# Patient Record
Sex: FEMALE | Race: BLACK OR AFRICAN AMERICAN | Employment: FULL TIME | URBAN - METROPOLITAN AREA
[De-identification: names, ages, dates, MRNs, and addresses within clinical notes are randomized per-mention and may not be internally consistent; named-entity substitution may affect disease eponyms.]

---

## 2017-05-26 ENCOUNTER — HOSPITAL ENCOUNTER (INPATIENT)
Age: 32
LOS: 5 days | Discharge: HOME | DRG: 328 | End: 2017-06-01
Attending: EMERGENCY MEDICINE | Admitting: SURGERY
Payer: COMMERCIAL

## 2017-05-26 ENCOUNTER — ANESTHESIA (OUTPATIENT)
Dept: SURGERY | Age: 32
DRG: 328 | End: 2017-05-26
Payer: COMMERCIAL

## 2017-05-26 ENCOUNTER — ANESTHESIA EVENT (OUTPATIENT)
Dept: SURGERY | Age: 32
DRG: 328 | End: 2017-05-26
Payer: COMMERCIAL

## 2017-05-26 ENCOUNTER — APPOINTMENT (OUTPATIENT)
Dept: CT IMAGING | Age: 32
DRG: 328 | End: 2017-05-26
Attending: PHYSICIAN ASSISTANT
Payer: COMMERCIAL

## 2017-05-26 DIAGNOSIS — K35.80 ACUTE APPENDICITIS, UNSPECIFIED ACUTE APPENDICITIS TYPE: Primary | ICD-10-CM

## 2017-05-26 DIAGNOSIS — D72.829 LEUKOCYTOSIS, UNSPECIFIED TYPE: ICD-10-CM

## 2017-05-26 PROBLEM — Z09 SURGERY FOLLOW-UP: Status: ACTIVE | Noted: 2017-05-26

## 2017-05-26 LAB
ALBUMIN SERPL BCP-MCNC: 4.2 G/DL (ref 3.4–5)
ALBUMIN/GLOB SERPL: 1 {RATIO} (ref 0.8–1.7)
ALP SERPL-CCNC: 68 U/L (ref 45–117)
ALT SERPL-CCNC: 16 U/L (ref 13–56)
ANION GAP BLD CALC-SCNC: 8 MMOL/L (ref 3–18)
APPEARANCE UR: CLEAR
AST SERPL W P-5'-P-CCNC: 26 U/L (ref 15–37)
BASOPHILS # BLD AUTO: 0 K/UL (ref 0–0.06)
BASOPHILS # BLD: 0 % (ref 0–2)
BILIRUB SERPL-MCNC: 0.3 MG/DL (ref 0.2–1)
BILIRUB UR QL: NEGATIVE
BUN SERPL-MCNC: 13 MG/DL (ref 7–18)
BUN/CREAT SERPL: 19 (ref 12–20)
CALCIUM SERPL-MCNC: 9.7 MG/DL (ref 8.5–10.1)
CHLORIDE SERPL-SCNC: 101 MMOL/L (ref 100–108)
CO2 SERPL-SCNC: 30 MMOL/L (ref 21–32)
COLOR UR: YELLOW
CREAT SERPL-MCNC: 0.69 MG/DL (ref 0.6–1.3)
DIFFERENTIAL METHOD BLD: ABNORMAL
EOSINOPHIL # BLD: 0.1 K/UL (ref 0–0.4)
EOSINOPHIL NFR BLD: 1 % (ref 0–5)
ERYTHROCYTE [DISTWIDTH] IN BLOOD BY AUTOMATED COUNT: 14.3 % (ref 11.6–14.5)
GLOBULIN SER CALC-MCNC: 4.1 G/DL (ref 2–4)
GLUCOSE SERPL-MCNC: 104 MG/DL (ref 74–99)
GLUCOSE UR STRIP.AUTO-MCNC: NEGATIVE MG/DL
HCG SERPL QL: NEGATIVE
HCG UR QL: NEGATIVE
HCT VFR BLD AUTO: 37.6 % (ref 35–45)
HGB BLD-MCNC: 12.9 G/DL (ref 12–16)
HGB UR QL STRIP: NEGATIVE
KETONES UR QL STRIP.AUTO: NEGATIVE MG/DL
LEUKOCYTE ESTERASE UR QL STRIP.AUTO: NEGATIVE
LIPASE SERPL-CCNC: 110 U/L (ref 73–393)
LYMPHOCYTES # BLD AUTO: 14 % (ref 21–52)
LYMPHOCYTES # BLD: 2.5 K/UL (ref 0.9–3.6)
MCH RBC QN AUTO: 31.1 PG (ref 24–34)
MCHC RBC AUTO-ENTMCNC: 34.3 G/DL (ref 31–37)
MCV RBC AUTO: 90.6 FL (ref 74–97)
MONOCYTES # BLD: 0.9 K/UL (ref 0.05–1.2)
MONOCYTES NFR BLD AUTO: 5 % (ref 3–10)
NEUTS SEG # BLD: 14.4 K/UL (ref 1.8–8)
NEUTS SEG NFR BLD AUTO: 80 % (ref 40–73)
NITRITE UR QL STRIP.AUTO: NEGATIVE
PH UR STRIP: 7 [PH] (ref 5–8)
PLATELET # BLD AUTO: 388 K/UL (ref 135–420)
PMV BLD AUTO: 9.8 FL (ref 9.2–11.8)
POTASSIUM SERPL-SCNC: 3.8 MMOL/L (ref 3.5–5.5)
PROT SERPL-MCNC: 8.3 G/DL (ref 6.4–8.2)
PROT UR STRIP-MCNC: NEGATIVE MG/DL
RBC # BLD AUTO: 4.15 M/UL (ref 4.2–5.3)
SODIUM SERPL-SCNC: 139 MMOL/L (ref 136–145)
SP GR UR REFRACTOMETRY: 1.02 (ref 1–1.03)
UROBILINOGEN UR QL STRIP.AUTO: 1 EU/DL (ref 0.2–1)
WBC # BLD AUTO: 17.9 K/UL (ref 4.6–13.2)

## 2017-05-26 PROCEDURE — 77030012407 HC DRN WND BARD -B: Performed by: SURGERY

## 2017-05-26 PROCEDURE — 77030008683 HC TU ET CUF COVD -A: Performed by: ANESTHESIOLOGY

## 2017-05-26 PROCEDURE — 77030033271 HC TRCR ENDOSC EPATH2 J&J -B: Performed by: SURGERY

## 2017-05-26 PROCEDURE — 81025 URINE PREGNANCY TEST: CPT

## 2017-05-26 PROCEDURE — 77030006643: Performed by: ANESTHESIOLOGY

## 2017-05-26 PROCEDURE — 76060000034 HC ANESTHESIA 1.5 TO 2 HR: Performed by: SURGERY

## 2017-05-26 PROCEDURE — 77030002933 HC SUT MCRYL J&J -A: Performed by: SURGERY

## 2017-05-26 PROCEDURE — 96374 THER/PROPH/DIAG INJ IV PUSH: CPT

## 2017-05-26 PROCEDURE — 96375 TX/PRO/DX INJ NEW DRUG ADDON: CPT

## 2017-05-26 PROCEDURE — 74011250636 HC RX REV CODE- 250/636: Performed by: PHYSICIAN ASSISTANT

## 2017-05-26 PROCEDURE — 99284 EMERGENCY DEPT VISIT MOD MDM: CPT

## 2017-05-26 PROCEDURE — 80053 COMPREHEN METABOLIC PANEL: CPT | Performed by: PHYSICIAN ASSISTANT

## 2017-05-26 PROCEDURE — 81003 URINALYSIS AUTO W/O SCOPE: CPT | Performed by: FAMILY MEDICINE

## 2017-05-26 PROCEDURE — 74011636320 HC RX REV CODE- 636/320: Performed by: EMERGENCY MEDICINE

## 2017-05-26 PROCEDURE — 77030011640 HC PAD GRND REM COVD -A: Performed by: SURGERY

## 2017-05-26 PROCEDURE — 74011250636 HC RX REV CODE- 250/636: Performed by: SURGERY

## 2017-05-26 PROCEDURE — 0DJD4ZZ INSPECTION OF LOWER INTESTINAL TRACT, PERCUTANEOUS ENDOSCOPIC APPROACH: ICD-10-PCS | Performed by: SURGERY

## 2017-05-26 PROCEDURE — 77030034154 HC SHR COAG HARM ACE J&J -F: Performed by: SURGERY

## 2017-05-26 PROCEDURE — 77030032490 HC SLV COMPR SCD KNE COVD -B: Performed by: SURGERY

## 2017-05-26 PROCEDURE — 77030003580 HC NDL INSUF VERES J&J -B: Performed by: SURGERY

## 2017-05-26 PROCEDURE — 76010000153 HC OR TIME 1.5 TO 2 HR: Performed by: SURGERY

## 2017-05-26 PROCEDURE — 74011000250 HC RX REV CODE- 250

## 2017-05-26 PROCEDURE — 77030013079 HC BLNKT BAIR HGGR 3M -A: Performed by: ANESTHESIOLOGY

## 2017-05-26 PROCEDURE — 77030002935 HC SUT MCRYL J&J -C: Performed by: SURGERY

## 2017-05-26 PROCEDURE — 74011000250 HC RX REV CODE- 250: Performed by: SURGERY

## 2017-05-26 PROCEDURE — 74011250636 HC RX REV CODE- 250/636

## 2017-05-26 PROCEDURE — 0DQ90ZZ REPAIR DUODENUM, OPEN APPROACH: ICD-10-PCS | Performed by: SURGERY

## 2017-05-26 PROCEDURE — C9290 INJ, BUPIVACAINE LIPOSOME: HCPCS | Performed by: SURGERY

## 2017-05-26 PROCEDURE — 74177 CT ABD & PELVIS W/CONTRAST: CPT

## 2017-05-26 PROCEDURE — 74011250636 HC RX REV CODE- 250/636: Performed by: ANESTHESIOLOGY

## 2017-05-26 PROCEDURE — 77030037875 HC DRSG MEPILEX <16IN BORD MOLN -A: Performed by: SURGERY

## 2017-05-26 PROCEDURE — 77030002996 HC SUT SLK J&J -A: Performed by: SURGERY

## 2017-05-26 PROCEDURE — 96361 HYDRATE IV INFUSION ADD-ON: CPT

## 2017-05-26 PROCEDURE — 77030031139 HC SUT VCRL2 J&J -A: Performed by: SURGERY

## 2017-05-26 PROCEDURE — 77030008477 HC STYL SATN SLP COVD -A: Performed by: ANESTHESIOLOGY

## 2017-05-26 PROCEDURE — 83690 ASSAY OF LIPASE: CPT | Performed by: PHYSICIAN ASSISTANT

## 2017-05-26 PROCEDURE — 76210000006 HC OR PH I REC 0.5 TO 1 HR: Performed by: SURGERY

## 2017-05-26 PROCEDURE — 77030008602 HC TRCR ENDOSC EPATH J&J -B: Performed by: SURGERY

## 2017-05-26 PROCEDURE — 77030002966 HC SUT PDS J&J -A: Performed by: SURGERY

## 2017-05-26 PROCEDURE — 0DJ04ZZ INSPECTION OF UPPER INTESTINAL TRACT, PERCUTANEOUS ENDOSCOPIC APPROACH: ICD-10-PCS | Performed by: SURGERY

## 2017-05-26 PROCEDURE — 77030010030: Performed by: SURGERY

## 2017-05-26 PROCEDURE — 77030018836 HC SOL IRR NACL ICUM -A: Performed by: SURGERY

## 2017-05-26 PROCEDURE — 77030013567 HC DRN WND RESERV BARD -A: Performed by: SURGERY

## 2017-05-26 PROCEDURE — 84703 CHORIONIC GONADOTROPIN ASSAY: CPT | Performed by: PHYSICIAN ASSISTANT

## 2017-05-26 PROCEDURE — 77030008603 HC TRCR ENDOSC EPATH J&J -C: Performed by: SURGERY

## 2017-05-26 PROCEDURE — 74011000258 HC RX REV CODE- 258: Performed by: SURGERY

## 2017-05-26 PROCEDURE — 85025 COMPLETE CBC W/AUTO DIFF WBC: CPT | Performed by: PHYSICIAN ASSISTANT

## 2017-05-26 PROCEDURE — 77030002916 HC SUT ETHLN J&J -A: Performed by: SURGERY

## 2017-05-26 RX ORDER — HYDROMORPHONE HYDROCHLORIDE 2 MG/1
1 TABLET ORAL
Status: DISCONTINUED | OUTPATIENT
Start: 2017-05-26 | End: 2017-05-27

## 2017-05-26 RX ORDER — BUPRENORPHINE AND NALOXONE 8; 2 MG/1; MG/1
1 FILM, SOLUBLE BUCCAL; SUBLINGUAL DAILY
COMMUNITY
End: 2017-06-01

## 2017-05-26 RX ORDER — ACETAMINOPHEN 10 MG/ML
987 INJECTION, SOLUTION INTRAVENOUS ONCE
Status: DISCONTINUED | OUTPATIENT
Start: 2017-05-26 | End: 2017-05-26 | Stop reason: HOSPADM

## 2017-05-26 RX ORDER — ONDANSETRON 2 MG/ML
INJECTION INTRAMUSCULAR; INTRAVENOUS AS NEEDED
Status: DISCONTINUED | OUTPATIENT
Start: 2017-05-26 | End: 2017-05-26 | Stop reason: HOSPADM

## 2017-05-26 RX ORDER — SUCCINYLCHOLINE CHLORIDE 20 MG/ML
INJECTION INTRAMUSCULAR; INTRAVENOUS AS NEEDED
Status: DISCONTINUED | OUTPATIENT
Start: 2017-05-26 | End: 2017-05-26 | Stop reason: HOSPADM

## 2017-05-26 RX ORDER — SODIUM CHLORIDE, SODIUM LACTATE, POTASSIUM CHLORIDE, CALCIUM CHLORIDE 600; 310; 30; 20 MG/100ML; MG/100ML; MG/100ML; MG/100ML
125 INJECTION, SOLUTION INTRAVENOUS CONTINUOUS
Status: DISCONTINUED | OUTPATIENT
Start: 2017-05-26 | End: 2017-05-27 | Stop reason: HOSPADM

## 2017-05-26 RX ORDER — HYDROMORPHONE HYDROCHLORIDE 1 MG/ML
1 INJECTION, SOLUTION INTRAMUSCULAR; INTRAVENOUS; SUBCUTANEOUS
Status: COMPLETED | OUTPATIENT
Start: 2017-05-26 | End: 2017-05-26

## 2017-05-26 RX ORDER — FENTANYL CITRATE 50 UG/ML
50 INJECTION, SOLUTION INTRAMUSCULAR; INTRAVENOUS
Status: DISCONTINUED | OUTPATIENT
Start: 2017-05-26 | End: 2017-05-27 | Stop reason: HOSPADM

## 2017-05-26 RX ORDER — DEXAMETHASONE SODIUM PHOSPHATE 4 MG/ML
INJECTION, SOLUTION INTRA-ARTICULAR; INTRALESIONAL; INTRAMUSCULAR; INTRAVENOUS; SOFT TISSUE AS NEEDED
Status: DISCONTINUED | OUTPATIENT
Start: 2017-05-26 | End: 2017-05-26 | Stop reason: HOSPADM

## 2017-05-26 RX ORDER — LIDOCAINE HYDROCHLORIDE 20 MG/ML
INJECTION, SOLUTION EPIDURAL; INFILTRATION; INTRACAUDAL; PERINEURAL AS NEEDED
Status: DISCONTINUED | OUTPATIENT
Start: 2017-05-26 | End: 2017-05-26 | Stop reason: HOSPADM

## 2017-05-26 RX ORDER — SODIUM CHLORIDE, SODIUM LACTATE, POTASSIUM CHLORIDE, CALCIUM CHLORIDE 600; 310; 30; 20 MG/100ML; MG/100ML; MG/100ML; MG/100ML
100 INJECTION, SOLUTION INTRAVENOUS CONTINUOUS
Status: DISCONTINUED | OUTPATIENT
Start: 2017-05-26 | End: 2017-05-26

## 2017-05-26 RX ORDER — ONDANSETRON 2 MG/ML
4 INJECTION INTRAMUSCULAR; INTRAVENOUS
Status: COMPLETED | OUTPATIENT
Start: 2017-05-26 | End: 2017-05-26

## 2017-05-26 RX ORDER — OXYCODONE AND ACETAMINOPHEN 5; 325 MG/1; MG/1
1 TABLET ORAL
Status: DISCONTINUED | OUTPATIENT
Start: 2017-05-26 | End: 2017-05-27

## 2017-05-26 RX ORDER — SODIUM CHLORIDE 0.9 % (FLUSH) 0.9 %
5-10 SYRINGE (ML) INJECTION EVERY 8 HOURS
Status: DISCONTINUED | OUTPATIENT
Start: 2017-05-27 | End: 2017-06-01 | Stop reason: HOSPADM

## 2017-05-26 RX ORDER — ONDANSETRON 2 MG/ML
4 INJECTION INTRAMUSCULAR; INTRAVENOUS
Status: DISCONTINUED | OUTPATIENT
Start: 2017-05-26 | End: 2017-06-01 | Stop reason: HOSPADM

## 2017-05-26 RX ORDER — SODIUM CHLORIDE 0.9 % (FLUSH) 0.9 %
5-10 SYRINGE (ML) INJECTION AS NEEDED
Status: DISCONTINUED | OUTPATIENT
Start: 2017-05-26 | End: 2017-06-01 | Stop reason: HOSPADM

## 2017-05-26 RX ORDER — FENTANYL CITRATE 50 UG/ML
INJECTION, SOLUTION INTRAMUSCULAR; INTRAVENOUS AS NEEDED
Status: DISCONTINUED | OUTPATIENT
Start: 2017-05-26 | End: 2017-05-26 | Stop reason: HOSPADM

## 2017-05-26 RX ORDER — GLYCOPYRROLATE 0.2 MG/ML
INJECTION INTRAMUSCULAR; INTRAVENOUS AS NEEDED
Status: DISCONTINUED | OUTPATIENT
Start: 2017-05-26 | End: 2017-05-26 | Stop reason: HOSPADM

## 2017-05-26 RX ORDER — ACETAMINOPHEN 10 MG/ML
1000 INJECTION, SOLUTION INTRAVENOUS ONCE
Status: COMPLETED | OUTPATIENT
Start: 2017-05-26 | End: 2017-05-26

## 2017-05-26 RX ORDER — SODIUM CHLORIDE 0.9 % (FLUSH) 0.9 %
5-10 SYRINGE (ML) INJECTION AS NEEDED
Status: DISCONTINUED | OUTPATIENT
Start: 2017-05-26 | End: 2017-05-27 | Stop reason: HOSPADM

## 2017-05-26 RX ORDER — NALOXONE HYDROCHLORIDE 0.4 MG/ML
0.4 INJECTION, SOLUTION INTRAMUSCULAR; INTRAVENOUS; SUBCUTANEOUS AS NEEDED
Status: DISCONTINUED | OUTPATIENT
Start: 2017-05-26 | End: 2017-05-27 | Stop reason: HOSPADM

## 2017-05-26 RX ORDER — MIDAZOLAM HYDROCHLORIDE 1 MG/ML
INJECTION, SOLUTION INTRAMUSCULAR; INTRAVENOUS AS NEEDED
Status: DISCONTINUED | OUTPATIENT
Start: 2017-05-26 | End: 2017-05-26 | Stop reason: HOSPADM

## 2017-05-26 RX ORDER — ACETAMINOPHEN 10 MG/ML
987 INJECTION, SOLUTION INTRAVENOUS
Status: DISCONTINUED | OUTPATIENT
Start: 2017-05-27 | End: 2017-06-01 | Stop reason: HOSPADM

## 2017-05-26 RX ORDER — PROPOFOL 10 MG/ML
INJECTION, EMULSION INTRAVENOUS AS NEEDED
Status: DISCONTINUED | OUTPATIENT
Start: 2017-05-26 | End: 2017-05-26 | Stop reason: HOSPADM

## 2017-05-26 RX ORDER — DEXTROSE, SODIUM CHLORIDE, AND POTASSIUM CHLORIDE 5; .45; .15 G/100ML; G/100ML; G/100ML
125 INJECTION INTRAVENOUS CONTINUOUS
Status: DISPENSED | OUTPATIENT
Start: 2017-05-27 | End: 2017-05-27

## 2017-05-26 RX ORDER — HYDROMORPHONE HYDROCHLORIDE 2 MG/ML
INJECTION, SOLUTION INTRAMUSCULAR; INTRAVENOUS; SUBCUTANEOUS AS NEEDED
Status: DISCONTINUED | OUTPATIENT
Start: 2017-05-26 | End: 2017-05-26 | Stop reason: HOSPADM

## 2017-05-26 RX ORDER — FLUMAZENIL 0.1 MG/ML
0.2 INJECTION INTRAVENOUS
Status: DISCONTINUED | OUTPATIENT
Start: 2017-05-26 | End: 2017-05-27 | Stop reason: HOSPADM

## 2017-05-26 RX ORDER — ROCURONIUM BROMIDE 10 MG/ML
INJECTION, SOLUTION INTRAVENOUS AS NEEDED
Status: DISCONTINUED | OUTPATIENT
Start: 2017-05-26 | End: 2017-05-26 | Stop reason: HOSPADM

## 2017-05-26 RX ORDER — KETAMINE HYDROCHLORIDE 10 MG/ML
INJECTION, SOLUTION INTRAMUSCULAR; INTRAVENOUS AS NEEDED
Status: DISCONTINUED | OUTPATIENT
Start: 2017-05-26 | End: 2017-05-26 | Stop reason: HOSPADM

## 2017-05-26 RX ORDER — NEOSTIGMINE METHYLSULFATE 5 MG/5 ML
SYRINGE (ML) INTRAVENOUS AS NEEDED
Status: DISCONTINUED | OUTPATIENT
Start: 2017-05-26 | End: 2017-05-26 | Stop reason: HOSPADM

## 2017-05-26 RX ADMIN — CEFOXITIN 2 G: 2 INJECTION, POWDER, FOR SOLUTION INTRAVENOUS at 20:02

## 2017-05-26 RX ADMIN — ACETAMINOPHEN 1000 MG: 10 INJECTION, SOLUTION INTRAVENOUS at 22:30

## 2017-05-26 RX ADMIN — FENTANYL CITRATE 100 MCG: 50 INJECTION, SOLUTION INTRAMUSCULAR; INTRAVENOUS at 20:45

## 2017-05-26 RX ADMIN — ONDANSETRON 4 MG: 2 INJECTION INTRAMUSCULAR; INTRAVENOUS at 20:49

## 2017-05-26 RX ADMIN — LIDOCAINE HYDROCHLORIDE 75 MG: 20 INJECTION, SOLUTION EPIDURAL; INFILTRATION; INTRACAUDAL; PERINEURAL at 20:45

## 2017-05-26 RX ADMIN — ROCURONIUM BROMIDE 25 MG: 10 INJECTION, SOLUTION INTRAVENOUS at 20:48

## 2017-05-26 RX ADMIN — ONDANSETRON 4 MG: 2 INJECTION INTRAMUSCULAR; INTRAVENOUS at 16:00

## 2017-05-26 RX ADMIN — SODIUM CHLORIDE 1000 ML: 900 INJECTION, SOLUTION INTRAVENOUS at 18:35

## 2017-05-26 RX ADMIN — ACETAMINOPHEN 1000 MG: 10 INJECTION, SOLUTION INTRAVENOUS at 17:15

## 2017-05-26 RX ADMIN — FENTANYL CITRATE 50 MCG: 50 INJECTION, SOLUTION INTRAMUSCULAR; INTRAVENOUS at 21:04

## 2017-05-26 RX ADMIN — SUCCINYLCHOLINE CHLORIDE 100 MG: 20 INJECTION INTRAMUSCULAR; INTRAVENOUS at 21:09

## 2017-05-26 RX ADMIN — GLYCOPYRROLATE 0.4 MG: 0.2 INJECTION INTRAMUSCULAR; INTRAVENOUS at 21:46

## 2017-05-26 RX ADMIN — SODIUM CHLORIDE, SODIUM LACTATE, POTASSIUM CHLORIDE, AND CALCIUM CHLORIDE: 600; 310; 30; 20 INJECTION, SOLUTION INTRAVENOUS at 21:14

## 2017-05-26 RX ADMIN — IOPAMIDOL 100 ML: 612 INJECTION, SOLUTION INTRAVENOUS at 16:53

## 2017-05-26 RX ADMIN — HYDROMORPHONE HYDROCHLORIDE 1 MG: 1 INJECTION, SOLUTION INTRAMUSCULAR; INTRAVENOUS; SUBCUTANEOUS at 16:00

## 2017-05-26 RX ADMIN — FENTANYL CITRATE 50 MCG: 50 INJECTION, SOLUTION INTRAMUSCULAR; INTRAVENOUS at 21:08

## 2017-05-26 RX ADMIN — KETAMINE HYDROCHLORIDE 20 MG: 10 INJECTION, SOLUTION INTRAMUSCULAR; INTRAVENOUS at 20:45

## 2017-05-26 RX ADMIN — HYDROMORPHONE HYDROCHLORIDE 0.2 MG: 2 INJECTION, SOLUTION INTRAMUSCULAR; INTRAVENOUS; SUBCUTANEOUS at 21:41

## 2017-05-26 RX ADMIN — SODIUM CHLORIDE 1000 ML: 900 INJECTION, SOLUTION INTRAVENOUS at 15:58

## 2017-05-26 RX ADMIN — ROCURONIUM BROMIDE 5 MG: 10 INJECTION, SOLUTION INTRAVENOUS at 20:45

## 2017-05-26 RX ADMIN — FENTANYL CITRATE 50 MCG: 50 INJECTION, SOLUTION INTRAMUSCULAR; INTRAVENOUS at 20:48

## 2017-05-26 RX ADMIN — PROPOFOL 150 MG: 10 INJECTION, EMULSION INTRAVENOUS at 20:45

## 2017-05-26 RX ADMIN — HYDROMORPHONE HYDROCHLORIDE 0.2 MG: 2 INJECTION, SOLUTION INTRAMUSCULAR; INTRAVENOUS; SUBCUTANEOUS at 21:51

## 2017-05-26 RX ADMIN — ROCURONIUM BROMIDE 10 MG: 10 INJECTION, SOLUTION INTRAVENOUS at 20:58

## 2017-05-26 RX ADMIN — DEXAMETHASONE SODIUM PHOSPHATE 4 MG: 4 INJECTION, SOLUTION INTRA-ARTICULAR; INTRALESIONAL; INTRAMUSCULAR; INTRAVENOUS; SOFT TISSUE at 21:43

## 2017-05-26 RX ADMIN — HYDROMORPHONE HYDROCHLORIDE 0.2 MG: 2 INJECTION, SOLUTION INTRAMUSCULAR; INTRAVENOUS; SUBCUTANEOUS at 21:38

## 2017-05-26 RX ADMIN — MIDAZOLAM HYDROCHLORIDE 2 MG: 1 INJECTION, SOLUTION INTRAMUSCULAR; INTRAVENOUS at 20:34

## 2017-05-26 RX ADMIN — HYDROMORPHONE HYDROCHLORIDE 0.2 MG: 2 INJECTION, SOLUTION INTRAMUSCULAR; INTRAVENOUS; SUBCUTANEOUS at 21:32

## 2017-05-26 RX ADMIN — SODIUM CHLORIDE, SODIUM LACTATE, POTASSIUM CHLORIDE, AND CALCIUM CHLORIDE 100 ML/HR: 600; 310; 30; 20 INJECTION, SOLUTION INTRAVENOUS at 19:34

## 2017-05-26 RX ADMIN — Medication 2 MG: at 21:46

## 2017-05-26 RX ADMIN — HYDROMORPHONE HYDROCHLORIDE 0.2 MG: 2 INJECTION, SOLUTION INTRAMUSCULAR; INTRAVENOUS; SUBCUTANEOUS at 21:29

## 2017-05-26 NOTE — IP AVS SNAPSHOT
Current Discharge Medication List  
  
START taking these medications Dose & Instructions Dispensing Information Comments Morning Noon Evening Bedtime HYDROmorphone 4 mg tablet Commonly known as:  DILAUDID Your last dose was: Your next dose is:    
   
   
 Dose:  4 mg Take 1 Tab by mouth every four (4) hours as needed for Pain. Max Daily Amount: 24 mg. Quantity:  30 Tab Refills:  0  
     
   
   
   
  
 pantoprazole 20 mg tablet Commonly known as:  PROTONIX Your last dose was: Your next dose is:    
   
   
 Dose:  20 mg Take 1 Tab by mouth two (2) times a day. Quantity:  60 Tab Refills:  0 STOP taking these medications SUBOXONE 8-2 mg Film sublingaul film Generic drug:  buprenorphine-naloxone Where to Get Your Medications Information on where to get these meds will be given to you by the nurse or doctor. ! Ask your nurse or doctor about these medications HYDROmorphone 4 mg tablet  
 pantoprazole 20 mg tablet

## 2017-05-26 NOTE — ED NOTES
Pt reportts no nausea at this time but no change in pain, reported to CARLOS collins and PA also infomred pt is on saboxone, iv tylenol suggested by John Nair RN, Pt lying on stretcher, no distress noted, Pt remains on monitor and call bell within reach, denies needing bathroom,  room safety check completed, informed of status, awaiting results, will continue to monitor.

## 2017-05-26 NOTE — ED NOTES
Pt c/o RLQ pain onset today at 1300, denies N/V/D, fever, vaginal d/c. Pt resting in NAD, VSS, side rails up x 2, family at bedside, call bell within reach.

## 2017-05-26 NOTE — IP AVS SNAPSHOT
Germán Bettencourt 
 
 
 [address] [facility] [address] 
[phone] Patient: Jenifer Shah MRN: [id number] FWQ:[id number] You are allergic to the following Allergen Reactions Pcn (Penicillins) Hives Sulfa (Sulfonamide Antibiotics) Hives Recent Documentation Height Weight BMI Smoking Status 1.651 m 69.5 kg 25.5 kg/m2 Current Every Day Smoker Emergency Contacts Name Discharge Info Relation Home Work Mobile [address]  Parent [1] [phone] About your hospitalization You were admitted on:  May 27, 2017 You last received care in the:  [address] [place] [address] You were discharged on:  June 1, 2017 Unit phone number:  [phone] Why you were hospitalized Your primary diagnosis was:  Not on File Your diagnoses also included:  Surgery Follow-Up Providers Seen During Your Hospitalizations Provider Role Specialty Primary office phone Alexander Lucas MD Attending Provider Family Practice [phone] Michael Jacobs MD Attending Provider General Surgery [phone] Your Primary Care Physician (PCP) Primary Care Physician Office Phone Office Fax UNKNOWN, PROVIDER ** None ** ** None ** Follow-up Information Follow up With Details Comments Contact Info Michael Jacobs MD On 6/9/2017 Follow up appointment scheduled for June 9, 2017 at 10:30 a.m. Please arrive at 10:00 a.m. for check in. [address] [facility] [address] [facility] 
[phone] Provider Unknown   Patient not available to ask Current Discharge Medication List  
  
START taking these medications Dose & Instructions Dispensing Information Comments Morning Noon Evening Bedtime HYDROmorphone 4 mg tablet Commonly known as:  DILAUDID Your last dose was: Your next dose is:    
   
   
 Dose:  4 mg Take 1 Tab by mouth every four (4) hours as needed for Pain. Max Daily Amount: 24 mg. Quantity:  30 Tab Refills:  0  
     
   
   
   
  
 pantoprazole 20 mg tablet Commonly known as:  PROTONIX Your last dose was: Your next dose is:    
   
   
 Dose:  20 mg Take 1 Tab by mouth two (2) times a day. Quantity:  60 Tab Refills:  0 STOP taking these medications SUBOXONE 8-2 mg Film sublingaul film Generic drug:  buprenorphine-naloxone Where to Get Your Medications Information on where to get these meds will be given to you by the nurse or doctor. ! Ask your nurse or doctor about these medications HYDROmorphone 4 mg tablet  
 pantoprazole 20 mg tablet Discharge Instructions Patient armband removed and shredded DISCHARGE SUMMARY from Nurse The following personal items are in your possession at time of discharge: 
 
Dental Appliances: None Visual Aid: None Home Medications: None Jewelry: None Clothing:  (with ) Other Valuables: None PATIENT INSTRUCTIONS: 
 
 
F-face looks uneven A-arms unable to move or move unevenly S-speech slurred or non-existent T-time-call 911 as soon as signs and symptoms begin-DO NOT go Back to bed or wait to see if you get better-TIME IS BRAIN. Warning Signs of HEART ATTACK Call 911 if you have these symptoms: 
? Chest discomfort. Most heart attacks involve discomfort in the center of the chest that lasts more than a few minutes, or that goes away and comes back. It can feel like uncomfortable pressure, squeezing, fullness, or pain. ? Discomfort in other areas of the upper body. Symptoms can include pain or discomfort in one or both arms, the back, neck, jaw, or stomach. ? Shortness of breath with or without chest discomfort. ? Other signs may include breaking out in a cold sweat, nausea, or lightheadedness. Don't wait more than five minutes to call [address]! Fast action can save your life. Calling 911 is almost always the fastest way to get lifesaving treatment. Emergency Medical Services staff can begin treatment when they arrive  up to an hour sooner than if someone gets to the hospital by car. The discharge information has been reviewed with the patient. The patient verbalized understanding. Discharge medications reviewed with the patient and appropriate educational materials and side effects teaching were provided. Discharge Orders None Introducing [facility]! Shefali Chand introduces [employer]ubis[employer] patient portal. Now you can access parts of your medical record, email your doctor's office, and request medication refills online. 1. In your internet browser, go to [email or web address] 2. Click on the First Time User? Click Here link in the Sign In box. You will see the New Member Sign Up page. 3. Enter your Nubisio Access Code exactly as it appears below. You will not need to use this code after youve completed the sign-up process. If you do not sign up before the expiration date, you must request a new code. · Nub[employer] Access Code: [email or web address] Expires: 8/24/2017  7:46 PM 
 
4. Enter the last four digits of your Social Security Number (xxxx) and Date of Birth (mm/dd/yyyy) as indicated and click Submit. You will be taken to the next sign-up page. 5. Create a Ã[employer]berResearcht ID. This will be your Nubisio login ID and cannot be changed, so think of one that is secure and easy to remember. 6. Create a [employer]berResearcht password. You can change your password at any time. 7. Enter your Password Reset Question and Answer. This can be used at a later time if you forget your password. 8. Enter your e-mail address. You will receive e-mail notification when new information is available in [address] 9. Click Sign Up. You can now view and download portions of your medical record. 10. Click the Download Summary menu link to download a portable copy of your medical information. If you have questions, please visit the Frequently Asked Questions section of the [employer] website. Remember, [employer]ellyo is NOT to be used for urgent needs. For medical emergencies, dial 911. Now available from your iPhone and Android! General Information Please provide this summary of care documentation to your next provider. Patient Signature:  ____________________________________________________________ Date:  ____________________________________________________________  
  
Fede Anderson Provider Signature:  ____________________________________________________________ Date:  ____________________________________________________________

## 2017-05-26 NOTE — ED TRIAGE NOTES
Patient reports she is having sharp right lower abdominal pain  Sepsis Screening completed    (  )Patient meets SIRS criteria. ( x )Patient does not meet SIRS criteria.       SIRS Criteria is achieved when two or more of the following are present   Temperature < 96.8°F (36°C) or > 100.9°F (38.3°C)   Heart Rate > 90 beats per minute   Respiratory Rate > 20 breaths per minute   WBC count > 12,000 or <4,000 or > 10% bands

## 2017-05-26 NOTE — ED NOTES
Pt lying on stretcher, reports decreased pain rates as 7/10 at this time, no distress noted, Pt remains on monitor and call bell within reach, denies needing bathroom,  room safety check completed, informed of status, awaiting results, will continue to monitor.

## 2017-05-26 NOTE — ED NOTES
Pt lying on stretcher, reports decreased abd pain rates 3/10 at this time, no distress noted, Pt remains on monitor and call bell within reach, denies needing bathroom,  room safety check completed, informed of status, awaiting results, will continue to monitor.

## 2017-05-26 NOTE — ED PROVIDER NOTES
HPI Comments: 3:23 PM  Reece Duarte is a 34 y.o. female presenting to the ED c/o waxing and waning, stabbing, tearing RLQ abd pain onset today at ~ 1100. Reports pain started gradually at 1100 and became more severe as the day went on. Associated sxs include back pain. Pt reports last po was around 1300. PMHx include opioid abuse. Pt is currently on 8 mg Suboxone film daily. States LNMP was 5/22/17, and she is currently menstruating. Denies nv, and any other sxs or complaints. Patient is a 34 y.o. female presenting with abdominal pain. The history is provided by the patient. Abdominal Pain    This is a new problem. The current episode started 1 to 2 hours ago. The pain is located in the RLQ. Associated symptoms include back pain. Pertinent negatives include no fever, no diarrhea, no nausea, no vomiting, no constipation, no dysuria, no frequency, no hematuria, no headaches, no arthralgias and no chest pain. Past Medical History:   Diagnosis Date    Opioid abuse        Past Surgical History:   Procedure Laterality Date    HX TUBAL LIGATION           No family history on file. Social History     Social History    Marital status:      Spouse name: N/A    Number of children: N/A    Years of education: N/A     Occupational History    Not on file. Social History Main Topics    Smoking status: Current Every Day Smoker     Packs/day: 1.00    Smokeless tobacco: Not on file    Alcohol use No    Drug use: No    Sexual activity: Not on file     Other Topics Concern    Not on file     Social History Narrative    No narrative on file         ALLERGIES: Pcn [penicillins] and Sulfa (sulfonamide antibiotics)    Review of Systems   Constitutional: Positive for appetite change. Negative for chills and fever. HENT: Negative for congestion. Respiratory: Negative for cough and shortness of breath. Cardiovascular: Negative for chest pain. Gastrointestinal: Positive for abdominal pain. Negative for constipation, diarrhea, nausea and vomiting. Genitourinary: Positive for vaginal bleeding (on menses). Negative for difficulty urinating, dysuria, frequency, hematuria, urgency and vaginal discharge. Musculoskeletal: Positive for back pain. Negative for arthralgias and joint swelling. Skin: Negative for rash and wound. Neurological: Negative for dizziness, weakness, light-headedness and headaches. Hematological: Negative for adenopathy. All other systems reviewed and are negative. Vitals:    05/26/17 1455 05/26/17 1832 05/26/17 1935   BP: 118/69 117/73 118/71   Pulse: 100 (!) 108 98   Resp: 16 16 16   Temp: 97.8 °F (36.6 °C) 99.2 °F (37.3 °C) 98.9 °F (37.2 °C)   SpO2: 100% 100% 100%   Weight: 65.8 kg (145 lb)     Height: 5' 5\" (1.651 m)              Physical Exam   Constitutional: She is oriented to person, place, and time. She appears well-developed and well-nourished. No distress. Female that appears uncomfortable. Sitting upright. Refusing to lay flat secondary to discomfort   HENT:   Head: Normocephalic and atraumatic. Right Ear: External ear normal.   Left Ear: External ear normal.   Mouth/Throat: Mucous membranes are normal.   Eyes: Conjunctivae are normal.   Neck: Normal range of motion. Cardiovascular: Normal rate, regular rhythm, normal heart sounds and intact distal pulses. Exam reveals no gallop and no friction rub. No murmur heard. Pulmonary/Chest: Effort normal and breath sounds normal. No accessory muscle usage. No tachypnea. No respiratory distress. She has no decreased breath sounds. She has no wheezes. She has no rhonchi. She has no rales. Abdominal: Soft. Normal appearance. She exhibits no ascites and no mass. There is tenderness in the right lower quadrant. There is guarding and CVA tenderness. There is no rigidity, no rebound, no tenderness at McBurney's point and negative Lucero's sign.        Neurological: She is alert and oriented to person, place, and time.   Skin: Skin is warm and dry. No rash noted. She is not diaphoretic. No erythema. Psychiatric: Judgment normal. Her mood appears anxious. She is agitated. Nursing note and vitals reviewed. RESULTS:    PULSE OXIMETRY NOTE:  Pulse-ox is 97% on room air  Interpretation: wnl       CT ABD PELV W CONT   Final Result   IMPRESSION:     1. Appendix is upper limits of normal with minimal stranding. Correlate with  clinical picture to exclude early changes of acute appendicitis.     2 dots of gas seen in the michelle hepatis. Patient has not had a prior  sphincterotomy to suggest biliary air. This raises the possibility of small  amount of free intraperitoneal air. Exact source is unclear. Again correlate  with clinical picture and obtain surgical consultation. Findings are discussed  with Aryan Riley 2:00 PM May 26, 2017    As read by the radiologist.        Labs Reviewed   CBC WITH AUTOMATED DIFF - Abnormal; Notable for the following:        Result Value    WBC 17.9 (*)     RBC 4.15 (*)     NEUTROPHILS 80 (*)     LYMPHOCYTES 14 (*)     ABS.  NEUTROPHILS 14.4 (*)     All other components within normal limits   METABOLIC PANEL, COMPREHENSIVE - Abnormal; Notable for the following:     Glucose 104 (*)     Protein, total 8.3 (*)     Globulin 4.1 (*)     All other components within normal limits   URINALYSIS W/ RFLX MICROSCOPIC   LIPASE   HCG QL SERUM   HCG URINE, QL. - POC   POC URINE PREGNANCY TEST       Recent Results (from the past 12 hour(s))   URINALYSIS W/ RFLX MICROSCOPIC    Collection Time: 05/26/17  3:33 PM   Result Value Ref Range    Color YELLOW      Appearance CLEAR      Specific gravity 1.025 1.005 - 1.030      pH (UA) 7.0 5.0 - 8.0      Protein NEGATIVE  NEG mg/dL    Glucose NEGATIVE  NEG mg/dL    Ketone NEGATIVE  NEG mg/dL    Bilirubin NEGATIVE  NEG      Blood NEGATIVE  NEG      Urobilinogen 1.0 0.2 - 1.0 EU/dL    Nitrites NEGATIVE  NEG      Leukocyte Esterase NEGATIVE  NEG     CBC WITH AUTOMATED DIFF    Collection Time: 05/26/17  4:00 PM   Result Value Ref Range    WBC 17.9 (H) 4.6 - 13.2 K/uL    RBC 4.15 (L) 4.20 - 5.30 M/uL    HGB 12.9 12.0 - 16.0 g/dL    HCT 37.6 35.0 - 45.0 %    MCV 90.6 74.0 - 97.0 FL    MCH 31.1 24.0 - 34.0 PG    MCHC 34.3 31.0 - 37.0 g/dL    RDW 14.3 11.6 - 14.5 %    PLATELET 991 635 - 562 K/uL    MPV 9.8 9.2 - 11.8 FL    NEUTROPHILS 80 (H) 40 - 73 %    LYMPHOCYTES 14 (L) 21 - 52 %    MONOCYTES 5 3 - 10 %    EOSINOPHILS 1 0 - 5 %    BASOPHILS 0 0 - 2 %    ABS. NEUTROPHILS 14.4 (H) 1.8 - 8.0 K/UL    ABS. LYMPHOCYTES 2.5 0.9 - 3.6 K/UL    ABS. MONOCYTES 0.9 0.05 - 1.2 K/UL    ABS. EOSINOPHILS 0.1 0.0 - 0.4 K/UL    ABS. BASOPHILS 0.0 0.0 - 0.06 K/UL    DF AUTOMATED     METABOLIC PANEL, COMPREHENSIVE    Collection Time: 05/26/17  4:00 PM   Result Value Ref Range    Sodium 139 136 - 145 mmol/L    Potassium 3.8 3.5 - 5.5 mmol/L    Chloride 101 100 - 108 mmol/L    CO2 30 21 - 32 mmol/L    Anion gap 8 3.0 - 18 mmol/L    Glucose 104 (H) 74 - 99 mg/dL    BUN 13 7.0 - 18 MG/DL    Creatinine 0.69 0.6 - 1.3 MG/DL    BUN/Creatinine ratio 19 12 - 20      GFR est AA >60 >60 ml/min/1.73m2    GFR est non-AA >60 >60 ml/min/1.73m2    Calcium 9.7 8.5 - 10.1 MG/DL    Bilirubin, total 0.3 0.2 - 1.0 MG/DL    ALT (SGPT) 16 13 - 56 U/L    AST (SGOT) 26 15 - 37 U/L    Alk.  phosphatase 68 45 - 117 U/L    Protein, total 8.3 (H) 6.4 - 8.2 g/dL    Albumin 4.2 3.4 - 5.0 g/dL    Globulin 4.1 (H) 2.0 - 4.0 g/dL    A-G Ratio 1.0 0.8 - 1.7     LIPASE    Collection Time: 05/26/17  4:00 PM   Result Value Ref Range    Lipase 110 73 - 393 U/L   HCG QL SERUM    Collection Time: 05/26/17  4:00 PM   Result Value Ref Range    HCG, Ql. NEGATIVE  NEG     HCG URINE, QL. - POC    Collection Time: 05/26/17  4:51 PM   Result Value Ref Range    Pregnancy test,urine (POC) NEGATIVE  NEG         MDM  Number of Diagnoses or Management Options  Acute appendicitis, unspecified acute appendicitis type:   Leukocytosis, unspecified type: Diagnosis management comments: Appy, colitis, diverticulitis, pancreatitis, hepatitis, gastroenteritis, GERD, UTI/pyelo, stone, ovarian cyst, torsion. Amount and/or Complexity of Data Reviewed  Clinical lab tests: ordered and reviewed  Tests in the radiology section of CPT®: ordered and reviewed (CT A/P)    Critical Care  Total time providing critical care: 30-74 minutes (30 Minutes)    ED Course     MEDICATIONS GIVEN:  Medications   lactated ringers infusion (100 mL/hr IntraVENous New Bag 5/26/17 1934)   cefOXitin (MEFOXIN) 2 g in 0.9% sodium chloride (MBP/ADV) 100 mL MBP (not administered)   HYDROmorphone (PF) (DILAUDID) injection 1 mg (1 mg IntraVENous Given 5/26/17 1600)   ondansetron (ZOFRAN) injection 4 mg (4 mg IntraVENous Given 5/26/17 1600)   sodium chloride 0.9 % bolus infusion 1,000 mL (0 mL IntraVENous IV Completed 5/26/17 1715)   acetaminophen (OFIRMEV) infusion 1,000 mg (1,000 mg IntraVENous Given 5/26/17 1715)   iopamidol (ISOVUE 300) 61 % contrast injection 100 mL (100 mL IntraVENous Given 5/26/17 1653)   sodium chloride 0.9 % bolus infusion 1,000 mL (0 mL IntraVENous IV Completed 5/26/17 1935)       Procedures    PROGRESS NOTE:  3:23 PM  Initial assessment performed. CONSULT NOTE:   6:05 PM  Betzaida Bonilla PA-C spoke with Liana Ambrocio MD    Specialty: Surgery  Discussed pt's hx, disposition, and available diagnostic and imaging results over the telephone. Reviewed care plans. Consulting physician agrees to look at the CT scan and will call back. PROGRESS NOTE:   7:42 PM  Liana Ambrocio MD, is in the ED evaluating pt. PROGRESS NOTE:   7:51 PM  OR team called to notify that they are taking pt to the OR for LAP APPY. ADMISSION NOTE:  7:51 PM  Patient is being admitted to the OR. The results of their tests and reasons for their admission have been discussed with them and/or available family.  They convey agreement and understanding for the need to be admitted and for their admission diagnosis. CONDITIONS ON ADMISSION:  Deep Vein Thrombosis is not present at the time of admission. Thrombosis is not present at the time of admission. Urinary Tract Infection is not present at the time of admission. MRSA is not present at the time of admission. Wound infection is not present at the time of admission. Pressure Ulcer is not present at the time of admission. CLINICAL IMPRESSION    1. Acute appendicitis, unspecified acute appendicitis type    2. Leukocytosis, unspecified type      CRITICAL CARE TIME  7:54 PM  I have spent 30 minutes of critical care time involved in lab review, consultations with specialist, family decision-making, and documentation. During this entire length of time I was immediately available to the patient. Critical Care: The reason for providing this level of medical care for this critically ill patient was due a critical illness that impaired one or more vital organ systems such that there was a high probability of imminent or life threatening deterioration in the patients condition. This care involved high complexity decision making to assess, manipulate, and support vital system functions, to treat this degreee vital organ system failure and to prevent further life threatening deterioration of the patients condition. SCRIBE ATTESTATION STATEMENT  Documented by:Janene Bucio Diss for and in the presence of Louis Martínez PA-C.     PROVIDER ATTESTATION STATEMENT  I personally performed the services described in the documentation, reviewed the documentation, as recorded by the scribe in my presence, and it accurately and completely records my words and actions.   Louis Martínez PA-C.

## 2017-05-27 PROCEDURE — C9113 INJ PANTOPRAZOLE SODIUM, VIA: HCPCS | Performed by: SURGERY

## 2017-05-27 PROCEDURE — 74011000250 HC RX REV CODE- 250: Performed by: SURGERY

## 2017-05-27 PROCEDURE — 65270000029 HC RM PRIVATE

## 2017-05-27 PROCEDURE — 74011000258 HC RX REV CODE- 258: Performed by: SURGERY

## 2017-05-27 PROCEDURE — 74011250636 HC RX REV CODE- 250/636: Performed by: SURGERY

## 2017-05-27 RX ORDER — HYDROMORPHONE HYDROCHLORIDE 1 MG/ML
1 INJECTION, SOLUTION INTRAMUSCULAR; INTRAVENOUS; SUBCUTANEOUS
Status: DISCONTINUED | OUTPATIENT
Start: 2017-05-27 | End: 2017-05-28

## 2017-05-27 RX ADMIN — HYDROMORPHONE HYDROCHLORIDE 1 MG: 1 INJECTION, SOLUTION INTRAMUSCULAR; INTRAVENOUS; SUBCUTANEOUS at 19:12

## 2017-05-27 RX ADMIN — CEFOXITIN SODIUM 1 G: 1 POWDER, FOR SOLUTION INTRAVENOUS at 11:50

## 2017-05-27 RX ADMIN — HYDROMORPHONE HYDROCHLORIDE 1 MG: 1 INJECTION, SOLUTION INTRAMUSCULAR; INTRAVENOUS; SUBCUTANEOUS at 11:50

## 2017-05-27 RX ADMIN — HYDROMORPHONE HYDROCHLORIDE 1 MG: 1 INJECTION, SOLUTION INTRAMUSCULAR; INTRAVENOUS; SUBCUTANEOUS at 23:38

## 2017-05-27 RX ADMIN — Medication 10 ML: at 21:08

## 2017-05-27 RX ADMIN — SODIUM CHLORIDE 40 MG: 9 INJECTION INTRAMUSCULAR; INTRAVENOUS; SUBCUTANEOUS at 08:42

## 2017-05-27 RX ADMIN — DEXTROSE MONOHYDRATE, SODIUM CHLORIDE, AND POTASSIUM CHLORIDE 125 ML/HR: 50; 4.5; 1.49 INJECTION, SOLUTION INTRAVENOUS at 00:48

## 2017-05-27 RX ADMIN — DEXTROSE MONOHYDRATE, SODIUM CHLORIDE, AND POTASSIUM CHLORIDE 125 ML/HR: 50; 4.5; 1.49 INJECTION, SOLUTION INTRAVENOUS at 18:50

## 2017-05-27 RX ADMIN — SODIUM CHLORIDE 40 MG: 9 INJECTION INTRAMUSCULAR; INTRAVENOUS; SUBCUTANEOUS at 21:07

## 2017-05-27 RX ADMIN — CEFOXITIN SODIUM 1 G: 1 POWDER, FOR SOLUTION INTRAVENOUS at 19:12

## 2017-05-27 RX ADMIN — HYDROMORPHONE HYDROCHLORIDE 1 MG: 1 INJECTION, SOLUTION INTRAMUSCULAR; INTRAVENOUS; SUBCUTANEOUS at 15:54

## 2017-05-27 RX ADMIN — DEXTROSE MONOHYDRATE, SODIUM CHLORIDE, AND POTASSIUM CHLORIDE 125 ML/HR: 50; 4.5; 1.49 INJECTION, SOLUTION INTRAVENOUS at 10:01

## 2017-05-27 RX ADMIN — Medication 10 ML: at 15:57

## 2017-05-27 RX ADMIN — HYDROMORPHONE HYDROCHLORIDE 1 MG: 1 INJECTION, SOLUTION INTRAMUSCULAR; INTRAVENOUS; SUBCUTANEOUS at 17:49

## 2017-05-27 RX ADMIN — CEFOXITIN SODIUM 1 G: 1 POWDER, FOR SOLUTION INTRAVENOUS at 03:32

## 2017-05-27 RX ADMIN — ACETAMINOPHEN 987 MG: 10 INJECTION, SOLUTION INTRAVENOUS at 08:42

## 2017-05-27 RX ADMIN — HYDROMORPHONE HYDROCHLORIDE 1 MG: 1 INJECTION, SOLUTION INTRAMUSCULAR; INTRAVENOUS; SUBCUTANEOUS at 21:41

## 2017-05-27 RX ADMIN — HYDROMORPHONE HYDROCHLORIDE 1 MG: 1 INJECTION, SOLUTION INTRAMUSCULAR; INTRAVENOUS; SUBCUTANEOUS at 13:53

## 2017-05-27 NOTE — PERIOP NOTES
TRANSFER - OUT REPORT:    Verbal report given to Deya Carrillo (name) on Priyank Tate  being transferred to [address] [place] [address](unit) for routine post - op       Report consisted of patients Situation, Background, Assessment and   Recommendations(SBAR). Information from the following report(s) OR Summary, Intake/Output and MAR was reviewed with the receiving nurse. Lines:   Peripheral IV 05/26/17 Anterior Antecubital (Active)        Opportunity for questions and clarification was provided.       Patient transported with:   O2 @ 2 liters  Registered Nurse  [facility]

## 2017-05-27 NOTE — ROUTINE PROCESS
TRANSFER - OUT REPORT:    Bedside report given to DANA Beach on Maribel Score  being transferred to OR for ordered procedure       Report consisted of patients Situation, Background, Assessment and   Recommendations(SBAR). Information from the following report(s) SBAR, ED Summary, Intake/Output, MAR and Recent Results was reviewed with the receiving nurse. Lines:   Peripheral IV 05/26/17 Anterior Antecubital (Active)        Opportunity for questions and clarification was provided.       Patient transported with:   Patient-specific medications from Pharmacy  Registered Nurse  [employer]

## 2017-05-27 NOTE — OP NOTES
[address] [place] [address]  OPERATIVE REPORT    Name:  Eugenie Cervantes  MR#:  [id number]  :  1985  Account #:  [de-identified]  Date of Adm:  2017  Date of Surgery:  2017      PREOPERATIVE DIAGNOSIS: Appendicitis and free intraperitoneal  air. POSTOPERATIVE DIAGNOSIS: Perforated duodenal ulcer. PROCEDURES PERFORMED: Laparoscope and repair of duodenal  ulcer. ATTENDING SURGEON: Pepe Murrieta MD    ANESTHESIA: General and local.    INDICATION FOR PROCEDURE: This is a 43-year-old female who  came to the emergency room with acute onset of abdominal pain. The  patient complained of right lower quadrant abdominal pain. CT scan  revealed a slightly dilated appendix with surrounding inflammation. There were some pockets of free intraperitoneal air in the right upper  quadrant near the michelle hepatis. The patient is brought to the operating  room for laparoscope. DESCRIPTION OF PROCEDURE: The patient was brought in the  operating room, placed on the table in the supine position. After  placing monitors and adequate general anesthesia, the abdomen was  prepped and draped in the usual sterile fashion. SCD hose were  placed and the patient got IV antibiotic. A horizontal incision was made  superior to the umbilicus through the skin down through subcutaneous  tissue. A Veress needle was placed in the abdomen and it was  insufflated with carbon dioxide to 15 mmHg pressure. A 10 mm port  was placed at this location, and the scope was placed into the  peritoneal cavity. A 5 mm port was placed in the right lower quadrant. The appendix was identified and it was not inflamed. It appeared to be  relatively normal size. Inspection of the upper abdomen revealed some  fibrinous exudate in the subhepatic space. Therefore, the laparoscopic  equipment was removed an upper midline incision was made through  the skin down through subcutaneous tissue.  Fascia was divided and  the peritoneal cavity was entered. Inspection of the upper abdomen  revealed a very small, 2 mm duodenal perforation. This was oversewn  with interrupted 3-0 silk suture. The omentum was mobilized from the  transverse colon and placed over the repair and this was secured with  interrupted 3-0 silk suture. The abdomen was thoroughly irrigated with saline. A 15-Indonesian fluted  drain was placed through a separate stab incision and placed in  Morison pouch. This drain was secured to the skin with a 3-0 nylon  suture. The fascia was then closed with a running #1 PDS suture. Marcaine and Exparel were injected locally. Subcutaneous tissue was  closed with interrupted 3-0 Monocryl suture, and 4-0 Monocryl was  used to reapproximate the skin. Steri-Strips were applied and the  wound was dressed sterilely. The sponge, instrument, and needle  count was correct at the end of the procedure. ESTIMATED BLOOD LOSS: 20 mL    SPECIMENS REMOVED: None.         MD WALE Figueroa / [facility]  D:  05/26/2017   21:59  T:  05/26/2017   22:18  Job #:  [id number]

## 2017-05-27 NOTE — ANESTHESIA POSTPROCEDURE EVALUATION
Post-Anesthesia Evaluation and Assessment    Cardiovascular Function/Vital Signs  Visit Vitals    /86    Pulse 94    Temp 37.4 °C (99.3 °F)    Resp 16    Ht 5' 5\" (1.651 m)    Wt 65.8 kg (145 lb)    SpO2 100%    BMI 24.13 kg/m2       Patient is status post Procedure(s):  DIAGNOSTIC LAPAROSCOPY, OPEN REPAIR OF DUODENAL ULCER. Nausea/Vomiting: Controlled. Postoperative hydration reviewed and adequate. Pain:  Pain Scale 1: Visual (05/26/17 2300)  Pain Intensity 1: 0 (05/26/17 2300)   Managed. Neurological Status:   Neuro (WDL): Exceptions to WDL (05/26/17 2300)   At baseline. Mental Status and Level of Consciousness: Arousable. Pulmonary Status:   O2 Device: Nasal cannula (05/26/17 2230)   Adequate oxygenation and airway patent. Complications related to anesthesia: None    Post-anesthesia assessment completed. No concerns. Patient has met all discharge requirements.     Signed By: Barry Homans, CRNA    May 26, 2017

## 2017-05-27 NOTE — H&P
[address] [place] [address]  PRE-OP HISTORY AND PHYSICAL    Name:  Mahi Jones  MR#:  [id number]  :  1985  Account #:  [de-identified]  Date of Adm:  2017      CHIEF COMPLAINT: Abdominal pain. HISTORY OF PRESENT ILLNESS: A 27-year-old female who had  acute onset of right lower quadrant abdominal pain today. She has had  some anorexia, but no nausea, vomiting. No diarrhea. No  hematemesis, no blood per rectum. No hematuria or dysuria. No  vaginal discharge, no aggressive vaginal sacs. The patient had a CT  scan through the ER which revealed a mildly dilated appendix with  very vague inflammatory changes, but several small pockets of free air  in the right upper quadrant near the michelle hepatis. The patient feels ill  and looks ill and she is admitted to the hospital for a laparoscopic  appendectomy and other indicated procedures. PAST MEDICAL HISTORY: Remarkable for opioid abuse. She has  had a tubal ligation. ALLERGIES: SHE IS ALLERGIC TO  1. PENICILLIN. 2. SULFA. SOCIAL HISTORY: She is . She smokes on a daily basis. REVIEW OF SYSTEMS: No fevers or chills. Positive anorexia. No  headaches, sore throat, rhinorrhea, tinnitus, or vertigo. No respiratory  symptoms. No shortness of breath. No sputum production or  wheezing. No angina, no cardiac arrhythmia or syncopal episodes. Positive abdominal pain. No hematuria or dysuria. She is on her  menstrual period. Positive back pain, but no other significant  arthralgias or myalgias. No skin rashes. No seizure disorder. PHYSICAL EXAMINATION  GENERAL: The patient looks ill. HEENT: Her sclerae are anicteric. SKIN: Sallow. Warm and dry. NECK: No cervical lymphadenopathy. LUNGS: Clear bilaterally. HEART: Regular rate and rhythm. ABDOMEN: Reveals tenderness in the right lower quadrant, but no  diffuse peritoneal signs. EXTREMITIES: No extremity edema. NEUROLOGIC: Grossly neurologically intact.     ANCILLARY INFORMATION: Her white count is 17.9. Her hemoglobin  is normal at 12.9. Lipase is normal. Serum pregnancy is negative. IMPRESSION: Right lower quadrant abdominal pain with findings of  early appendicitis; however, with free intraperitoneal air in the right  upper quadrant. I suspect that this is a perforated appendicitis. Will  continue IV fluid hydration, IV antibiotics and plan for laparoscopic  appendectomy.         MD WALE Swartz / JOSESITO  D:  05/26/2017   91:71  T:  05/26/2017   20:25  Job #:  [id number]

## 2017-05-27 NOTE — ROUTINE PROCESS
Bedside and Verbal shift change report given to Rian Mixon RN (oncoming nurse) by Brittny Kirby RN (offgoing nurse). Report included the following information SBAR, Kardex, ED Summary, OR Summary, Procedure Summary, Intake/Output, MAR, Recent Results and Med Rec Status.

## 2017-05-27 NOTE — ANESTHESIA PREPROCEDURE EVALUATION
Anesthetic History        Pertinent negatives: No PONV       Review of Systems / Medical History  Patient summary reviewed, nursing notes reviewed and pertinent labs reviewed    Pulmonary          Smoker (smoked today)    Pertinent negatives: No asthma and sleep apnea     Neuro/Psych           Pertinent negatives: No seizures and CVA   Cardiovascular                Pertinent negatives: No hypertension, dysrhythmias, angina, CHF and hyperlipidemia  Exercise tolerance: >4 METS     GI/Hepatic/Renal             Pertinent negatives: No GERD, liver disease and renal disease   Endo/Other          Pertinent negatives: No diabetes and obesity   Other Findings   Comments: Pt with h/o opioid abuse, on suboxone           Physical Exam    Airway  Mallampati: II  TM Distance: 4 - 6 cm  Neck ROM: normal range of motion   Mouth opening: Normal     Cardiovascular    Rhythm: regular  Rate: normal         Dental  No notable dental hx       Pulmonary  Breath sounds clear to auscultation               Abdominal  GI exam deferred       Other Findings            Anesthetic Plan    ASA: 2, emergent  Anesthesia type: general          Induction: Intravenous  Anesthetic plan and risks discussed with: Patient      Plan GETA with RSI, succinylcholine, cricoid pressure, Terry laryngoscope. Pt understands risks and agrees to proceed.

## 2017-05-27 NOTE — PROGRESS NOTES
Shift summary: Shift uneventful, patient rested during shift. Ambulating to bathroom, voiding without difficulty. NG tube on intermittent suction: output for shift: 50 ml. Abdominal dressing: CDI. AMERICA output for shift: 15 ml.

## 2017-05-27 NOTE — PROGRESS NOTES
0- Received pt from Aileen Ibarra RN. Pt in bed resting quietly with visitor bedside. Pt assisted to the bathroom at this time. Call bell and phone within reach. Room clutter free. Nurse will continue to monitor. 8430- Physical assessment and morning meds passed at this time. IV Tylenol given for pain 5/10. Shift summary- Uneventful. Pt needs encouragement to ambulate. Stomach is slightly distended.  Pt requiring pain medicine q2h

## 2017-05-27 NOTE — ROUTINE PROCESS
Bedside and Verbal shift change report given to A. Elmarie Osgood, RN (oncoming nurse) by Lexx Gabriel RN   (offgoing nurse). Report included the following information SBAR, Kardex, Intake/Output, MAR and Recent Results.

## 2017-05-27 NOTE — PROGRESS NOTES
Progress Note    Patient: Jenifer Shah MRN: [id number]  CSN: [id number]    YOB: 1985  Age: 34 y.o. Sex: female    DOA: 5/26/2017 LOS:  LOS: 0 days                    Subjective:     C/o sore throat from NGT    Objective:      Visit Vitals    /69 (BP 1 Location: Left arm, BP Patient Position: At rest)    Pulse 79    Temp 98.8 °F (37.1 °C)    Resp 18    Ht 5' 5\" (1.651 m)    Wt 65.8 kg (145 lb)    SpO2 99%    BMI 24.13 kg/m2       Physical Exam:  NGT with little light brown fluid, AMERICA with serosanginous, dressing dry    Intake and Output:  Current Shift:  05/27 0701 - 05/27 1900  In: -   Out: 20 [Drains:20]  Last three shifts:  05/25 1901 - 05/27 0700  In: 2889.6 [I.V.:2889.6]  Out: 125 [Drains:50]    Labs: Results:       Chemistry Recent Labs      05/26/17   1600   GLU  104*   NA  139   K  3.8   CL  101   CO2  30   BUN  13   CREA  0.69   CA  9.7   AGAP  8   BUCR  19   AP  68   TP  8.3*   ALB  4.2   GLOB  4.1*   AGRAT  1.0      CBC w/Diff Recent Labs      05/26/17   1600   WBC  17.9*   RBC  4.15*   HGB  12.9   HCT  37.6   PLT  388   GRANS  80*   LYMPH  14*   EOS  1      Cardiac Enzymes No results for input(s): CPK, CKND1, ELIANA in the last 72 hours. No lab exists for component: CKRMB, TROIP   Coagulation No results for input(s): PTP, INR, APTT in the last 72 hours. No lab exists for component: INREXT    Lipid Panel No results found for: CHOL, CHOLPOCT, CHOLX, CHLST, CHOLV, [id number], HDL, LDL, NLDLCT, DLDL, LDLC, DLDLP, [id number], VLDLC, VLDL, TGL, TGLX, TRIGL, [id number], TRIGP, TGLPOCT, [id number], CHHD, CHHDX   BNP No results for input(s): BNPP in the last 72 hours.    Liver Enzymes Recent Labs      05/26/17   1600   TP  8.3*   ALB  4.2   AP  68   SGOT  26      Thyroid Studies No results found for: T4, T3U, TSH, TSHEXT         Medications Reviewed      Assessment/Plan     Active Problems:    Surgery follow-up (5/26/2017)        Cont IVF, NGT, and IV protonix

## 2017-05-27 NOTE — ED NOTES
Assumed care of pt at this time. Report received from RAN Muse RN. Pt resting in NAD, family at bedside, NS infusing to left AC, VSS, RR equal and non-labored, call bell within reach, no needs at this time.

## 2017-05-27 NOTE — ROUTINE PROCESS
TRANSFER - IN REPORT:    Verbal report received from Elsa Burden RN (name) on Roberto Green  being received from PACU (unit) for routine post - op      Report consisted of patients Situation, Background, Assessment and   Recommendations(SBAR). Information from the following report(s) SBAR, Kardex, ED Summary, OR Summary, Procedure Summary, Intake/Output, MAR, Recent Results and Med Rec Status was reviewed with the receiving nurse. Opportunity for questions and clarification was provided. Assessment completed upon patients arrival to unit and care assumed.

## 2017-05-28 PROCEDURE — 36415 COLL VENOUS BLD VENIPUNCTURE: CPT | Performed by: SURGERY

## 2017-05-28 PROCEDURE — 65270000029 HC RM PRIVATE

## 2017-05-28 PROCEDURE — C9113 INJ PANTOPRAZOLE SODIUM, VIA: HCPCS | Performed by: SURGERY

## 2017-05-28 PROCEDURE — 86677 HELICOBACTER PYLORI ANTIBODY: CPT | Performed by: SURGERY

## 2017-05-28 PROCEDURE — 74011000250 HC RX REV CODE- 250: Performed by: SURGERY

## 2017-05-28 PROCEDURE — 74011250636 HC RX REV CODE- 250/636: Performed by: SURGERY

## 2017-05-28 PROCEDURE — 74011000258 HC RX REV CODE- 258: Performed by: SURGERY

## 2017-05-28 RX ORDER — HYDROMORPHONE HYDROCHLORIDE 1 MG/ML
1-2 INJECTION, SOLUTION INTRAMUSCULAR; INTRAVENOUS; SUBCUTANEOUS
Status: DISCONTINUED | OUTPATIENT
Start: 2017-05-28 | End: 2017-06-01 | Stop reason: HOSPADM

## 2017-05-28 RX ADMIN — HYDROMORPHONE HYDROCHLORIDE 1 MG: 1 INJECTION, SOLUTION INTRAMUSCULAR; INTRAVENOUS; SUBCUTANEOUS at 01:30

## 2017-05-28 RX ADMIN — HYDROMORPHONE HYDROCHLORIDE 2 MG: 1 INJECTION, SOLUTION INTRAMUSCULAR; INTRAVENOUS; SUBCUTANEOUS at 21:00

## 2017-05-28 RX ADMIN — HYDROMORPHONE HYDROCHLORIDE 1 MG: 1 INJECTION, SOLUTION INTRAMUSCULAR; INTRAVENOUS; SUBCUTANEOUS at 06:37

## 2017-05-28 RX ADMIN — CEFOXITIN SODIUM 1 G: 1 POWDER, FOR SOLUTION INTRAVENOUS at 13:18

## 2017-05-28 RX ADMIN — HYDROMORPHONE HYDROCHLORIDE 1 MG: 1 INJECTION, SOLUTION INTRAMUSCULAR; INTRAVENOUS; SUBCUTANEOUS at 03:24

## 2017-05-28 RX ADMIN — HYDROMORPHONE HYDROCHLORIDE 1 MG: 1 INJECTION, SOLUTION INTRAMUSCULAR; INTRAVENOUS; SUBCUTANEOUS at 08:12

## 2017-05-28 RX ADMIN — SODIUM CHLORIDE 40 MG: 9 INJECTION INTRAMUSCULAR; INTRAVENOUS; SUBCUTANEOUS at 21:00

## 2017-05-28 RX ADMIN — HYDROMORPHONE HYDROCHLORIDE 2 MG: 1 INJECTION, SOLUTION INTRAMUSCULAR; INTRAVENOUS; SUBCUTANEOUS at 17:22

## 2017-05-28 RX ADMIN — HYDROMORPHONE HYDROCHLORIDE 2 MG: 1 INJECTION, SOLUTION INTRAMUSCULAR; INTRAVENOUS; SUBCUTANEOUS at 09:28

## 2017-05-28 RX ADMIN — HYDROMORPHONE HYDROCHLORIDE 2 MG: 1 INJECTION, SOLUTION INTRAMUSCULAR; INTRAVENOUS; SUBCUTANEOUS at 15:54

## 2017-05-28 RX ADMIN — CEFOXITIN SODIUM 1 G: 1 POWDER, FOR SOLUTION INTRAVENOUS at 21:00

## 2017-05-28 RX ADMIN — HYDROMORPHONE HYDROCHLORIDE 2 MG: 1 INJECTION, SOLUTION INTRAMUSCULAR; INTRAVENOUS; SUBCUTANEOUS at 13:18

## 2017-05-28 RX ADMIN — CEFOXITIN SODIUM 1 G: 1 POWDER, FOR SOLUTION INTRAVENOUS at 03:25

## 2017-05-28 RX ADMIN — HYDROMORPHONE HYDROCHLORIDE 1 MG: 1 INJECTION, SOLUTION INTRAMUSCULAR; INTRAVENOUS; SUBCUTANEOUS at 05:12

## 2017-05-28 RX ADMIN — HYDROMORPHONE HYDROCHLORIDE 2 MG: 1 INJECTION, SOLUTION INTRAMUSCULAR; INTRAVENOUS; SUBCUTANEOUS at 10:55

## 2017-05-28 RX ADMIN — HYDROMORPHONE HYDROCHLORIDE 2 MG: 1 INJECTION, SOLUTION INTRAMUSCULAR; INTRAVENOUS; SUBCUTANEOUS at 18:57

## 2017-05-28 RX ADMIN — Medication 10 ML: at 05:12

## 2017-05-28 RX ADMIN — SODIUM CHLORIDE 40 MG: 9 INJECTION INTRAMUSCULAR; INTRAVENOUS; SUBCUTANEOUS at 08:12

## 2017-05-28 RX ADMIN — HYDROMORPHONE HYDROCHLORIDE 2 MG: 1 INJECTION, SOLUTION INTRAMUSCULAR; INTRAVENOUS; SUBCUTANEOUS at 23:41

## 2017-05-28 RX ADMIN — Medication 10 ML: at 21:00

## 2017-05-28 NOTE — PROGRESS NOTES
Progress Note    Patient: Vincent Smith MRN: [id number]  CSN: [id number]    YOB: 1985  Age: 34 y.o. Sex: female    DOA: 5/26/2017 LOS:  LOS: 1 day                    Subjective:     C/o sore throat, c/o abd pain, sharp    Objective:      Visit Vitals    /70 (BP 1 Location: Left arm, BP Patient Position: At rest)    Pulse 88    Temp 97.8 °F (36.6 °C)    Resp 18    Ht 5' 5\" (1.651 m)    Wt 70.2 kg (154 lb 12.8 oz)    SpO2 97%    BMI 25.76 kg/m2       Physical Exam:  AMERICA serosanginous, NGT with minimal light brown    Intake and Output:  Current Shift:  05/28 0701 - 05/28 1900  In: 0   Out: 15 [Drains:15]  Last three shifts:  05/26 1901 - 05/28 0700  In: 9323 [I.V.:4582]  Out: 1975 [Urine:1350; Drains:100]    Labs: Results:       Chemistry Recent Labs      05/26/17   1600   GLU  104*   NA  139   K  3.8   CL  101   CO2  30   BUN  13   CREA  0.69   CA  9.7   AGAP  8   BUCR  19   AP  68   TP  8.3*   ALB  4.2   GLOB  4.1*   AGRAT  1.0      CBC w/Diff Recent Labs      05/26/17   1600   WBC  17.9*   RBC  4.15*   HGB  12.9   HCT  37.6   PLT  388   GRANS  80*   LYMPH  14*   EOS  1      Cardiac Enzymes No results for input(s): CPK, CKND1, ELIANA in the last 72 hours. No lab exists for component: CKRMB, TROIP   Coagulation No results for input(s): PTP, INR, APTT in the last 72 hours. No lab exists for component: INREXT    Lipid Panel No results found for: CHOL, CHOLPOCT, CHOLX, CHLST, CHOLV, [id number], HDL, LDL, NLDLCT, DLDL, LDLC, DLDLP, 945582, VLDLC, VLDL, TGL, TGLX, TRIGL, [id number], TRIGP, TGLPOCT, [id number], CHHD, CHHDX   BNP No results for input(s): BNPP in the last 72 hours.    Liver Enzymes Recent Labs      05/26/17   1600   TP  8.3*   ALB  4.2   AP  68   SGOT  26      Thyroid Studies No results found for: T4, T3U, TSH, TSHEXT         Medications Reviewed      Assessment/Plan     Active Problems:    Surgery follow-up (5/26/2017)        Remove NGT, NPO, increase dialudid

## 2017-05-28 NOTE — ROUTINE PROCESS
Bedside and Verbal shift change report given to ALLISON Lobato RN (oncoming nurse) by Carolyn Banuelos RN (offgoing nurse). Report included the following information SBAR, Kardex, Intake/Output, MAR and Recent Results.

## 2017-05-28 NOTE — ROUTINE PROCESS
Bedside and Verbal shift change report given to SAVANNAH Drummond (oncoming nurse) by RAN Jon RN (offgoing nurse). Report included the following information SBAR, Kardex, Intake/Output and MAR.

## 2017-05-28 NOTE — PROGRESS NOTES
2095- Received pt from RAN Aceves RN. Pt in bed, resting quietly with  bedside. Call bell and phone within reach. Room clutter free. Nurse will continue to monitor. 1024- Spoke with Dr. Marybeth Romero regarding pt's request for ice chips. Orders given. 1642- Pt ambulating in hallway at this time. Shift summary- Uneventful. Pt remained stable throughout shift.      Patient Vitals for the past 12 hrs:   Temp Pulse Resp BP SpO2   05/28/17 1535 98.6 °F (37 °C) 87 18 122/73 100 %   05/28/17 1117 98.1 °F (36.7 °C) 81 20 120/70 99 %   05/28/17 0735 97.8 °F (36.6 °C) 88 18 130/70 97 %

## 2017-05-28 NOTE — PROGRESS NOTES
Shift summary: Pt A&Ox4, up with assistance,  at bedside. Pain managed with IV Dilaudid. NG tube to suction. Mepilex to abdomen clean, dry, and intact. Patient Vitals for the past 12 hrs:   Temp Pulse Resp BP SpO2   05/28/17 0458 - 76 18 123/67 95 %   05/28/17 0338 97 °F (36.1 °C) 72 20 127/72 96 %   05/27/17 2308 98.3 °F (36.8 °C) 79 20 122/73 95 %   05/27/17 1931 98.9 °F (37.2 °C) 95 16 119/70 97 %         0450: Pt reports feeling SOB. VSS. HR 76 RR 18 /67 SpO2 95%. Lungs clear bilaterally. NG tube placement verified. Pt encouraged to use incentive spirometer. 2879: Pt reevaluated stated that she feels like she can breathe better now.

## 2017-05-28 NOTE — ROUTINE PROCESS
Bedside and Verbal shift change report given to ALLISON Ignacio RN (oncoming nurse) by Rian Mixon RN (offgoing nurse). Report included the following information SBAR, Kardex, Intake/Output, MAR and Recent Results.

## 2017-05-29 PROCEDURE — 65270000029 HC RM PRIVATE

## 2017-05-29 PROCEDURE — 74011250636 HC RX REV CODE- 250/636: Performed by: SURGERY

## 2017-05-29 PROCEDURE — C9113 INJ PANTOPRAZOLE SODIUM, VIA: HCPCS | Performed by: SURGERY

## 2017-05-29 PROCEDURE — 74011000250 HC RX REV CODE- 250: Performed by: SURGERY

## 2017-05-29 PROCEDURE — 74011000258 HC RX REV CODE- 258: Performed by: SURGERY

## 2017-05-29 RX ORDER — DEXTROSE, SODIUM CHLORIDE, AND POTASSIUM CHLORIDE 5; .45; .15 G/100ML; G/100ML; G/100ML
50 INJECTION INTRAVENOUS CONTINUOUS
Status: DISCONTINUED | OUTPATIENT
Start: 2017-05-29 | End: 2017-05-31

## 2017-05-29 RX ADMIN — HYDROMORPHONE HYDROCHLORIDE 2 MG: 1 INJECTION, SOLUTION INTRAMUSCULAR; INTRAVENOUS; SUBCUTANEOUS at 01:44

## 2017-05-29 RX ADMIN — CEFOXITIN SODIUM 1 G: 1 POWDER, FOR SOLUTION INTRAVENOUS at 20:40

## 2017-05-29 RX ADMIN — HYDROMORPHONE HYDROCHLORIDE 2 MG: 1 INJECTION, SOLUTION INTRAMUSCULAR; INTRAVENOUS; SUBCUTANEOUS at 14:46

## 2017-05-29 RX ADMIN — HYDROMORPHONE HYDROCHLORIDE 2 MG: 1 INJECTION, SOLUTION INTRAMUSCULAR; INTRAVENOUS; SUBCUTANEOUS at 20:34

## 2017-05-29 RX ADMIN — HYDROMORPHONE HYDROCHLORIDE 1 MG: 1 INJECTION, SOLUTION INTRAMUSCULAR; INTRAVENOUS; SUBCUTANEOUS at 23:05

## 2017-05-29 RX ADMIN — HYDROMORPHONE HYDROCHLORIDE 2 MG: 1 INJECTION, SOLUTION INTRAMUSCULAR; INTRAVENOUS; SUBCUTANEOUS at 12:55

## 2017-05-29 RX ADMIN — CEFOXITIN SODIUM 1 G: 1 POWDER, FOR SOLUTION INTRAVENOUS at 11:21

## 2017-05-29 RX ADMIN — HYDROMORPHONE HYDROCHLORIDE 2 MG: 1 INJECTION, SOLUTION INTRAMUSCULAR; INTRAVENOUS; SUBCUTANEOUS at 06:00

## 2017-05-29 RX ADMIN — DEXTROSE MONOHYDRATE, SODIUM CHLORIDE, AND POTASSIUM CHLORIDE 100 ML/HR: 50; 4.5; 1.49 INJECTION, SOLUTION INTRAVENOUS at 11:21

## 2017-05-29 RX ADMIN — SODIUM CHLORIDE 40 MG: 9 INJECTION INTRAMUSCULAR; INTRAVENOUS; SUBCUTANEOUS at 08:18

## 2017-05-29 RX ADMIN — HYDROMORPHONE HYDROCHLORIDE 1 MG: 1 INJECTION, SOLUTION INTRAMUSCULAR; INTRAVENOUS; SUBCUTANEOUS at 23:28

## 2017-05-29 RX ADMIN — HYDROMORPHONE HYDROCHLORIDE 2 MG: 1 INJECTION, SOLUTION INTRAMUSCULAR; INTRAVENOUS; SUBCUTANEOUS at 11:21

## 2017-05-29 RX ADMIN — HYDROMORPHONE HYDROCHLORIDE 2 MG: 1 INJECTION, SOLUTION INTRAMUSCULAR; INTRAVENOUS; SUBCUTANEOUS at 17:03

## 2017-05-29 RX ADMIN — HYDROMORPHONE HYDROCHLORIDE 2 MG: 1 INJECTION, SOLUTION INTRAMUSCULAR; INTRAVENOUS; SUBCUTANEOUS at 03:52

## 2017-05-29 RX ADMIN — CEFOXITIN SODIUM 1 G: 1 POWDER, FOR SOLUTION INTRAVENOUS at 03:52

## 2017-05-29 RX ADMIN — HYDROMORPHONE HYDROCHLORIDE 2 MG: 1 INJECTION, SOLUTION INTRAMUSCULAR; INTRAVENOUS; SUBCUTANEOUS at 18:36

## 2017-05-29 RX ADMIN — Medication 10 ML: at 23:28

## 2017-05-29 RX ADMIN — Medication 10 ML: at 14:00

## 2017-05-29 RX ADMIN — SODIUM CHLORIDE 40 MG: 9 INJECTION INTRAMUSCULAR; INTRAVENOUS; SUBCUTANEOUS at 20:39

## 2017-05-29 RX ADMIN — SODIUM CHLORIDE 1000 ML: 900 INJECTION, SOLUTION INTRAVENOUS at 09:19

## 2017-05-29 RX ADMIN — Medication 10 ML: at 06:00

## 2017-05-29 RX ADMIN — HYDROMORPHONE HYDROCHLORIDE 2 MG: 1 INJECTION, SOLUTION INTRAMUSCULAR; INTRAVENOUS; SUBCUTANEOUS at 08:18

## 2017-05-29 NOTE — PROGRESS NOTES
attempted to visit patient but she was unavailable. Chaplains remain available to provide pastoral support to patient and family as needed and requested. Rev.  Zoe Curran  [phone]

## 2017-05-29 NOTE — ROUTINE PROCESS
Bedside and Verbal shift change report given to Burgess Abdelrahman RN (oncoming nurse) by Geroge Kanner, RN (offgoing nurse). Report included the following information SBAR, Kardex, ED Summary, Procedure Summary, Intake/Output, MAR, Recent Results and Med Rec Status.

## 2017-05-29 NOTE — PROGRESS NOTES
7903- Received pt from Surendra Medrano RN. Pt sitting up in bed with no needs voiced at this time.  bedside. Call light and phone within reach. Room clutter free. Nurse will continue to monitor. 8581- Physical assessment and morning meds given at this time. Pt lost IV access. 7642- IV access gained. Pain medicine given at this time. Gave pt heating pad to help with abdominal cramping      Shift summary- No acute changes over shift. Pt remained stable.    Patient Vitals for the past 12 hrs:   Temp Pulse Resp BP SpO2   05/29/17 1437 98.4 °F (36.9 °C) 84 18 132/77 97 %   05/29/17 1153 98.3 °F (36.8 °C) 81 18 126/73 100 %   05/29/17 0735 98.2 °F (36.8 °C) 78 17 116/67 95 %

## 2017-05-29 NOTE — PROGRESS NOTES
Shift summary: Pain managed with 2 mg IV Dilaudid per patient request. Abdomen soft and tender. Abdominal dressin: CDI. AMERICA output for shift: 30 ml. Incentive spirometer use and ambulation encouraged with complaints of chest congestion.  remained at bedside entire shift.

## 2017-05-29 NOTE — PROGRESS NOTES
Progress Note    Patient: Ramona Buck MRN: [id number]  CSN: [id number]    YOB: 1985  Age: 34 y.o. Sex: female    DOA: 5/26/2017 LOS:  LOS: 2 days                    Subjective:     C/o abd spasms    Objective:      Visit Vitals    /67    Pulse 78    Temp 98.2 °F (36.8 °C)    Resp 17    Ht 5' 5\" (1.651 m)    Wt 68.8 kg (151 lb 9.6 oz)    SpO2 95%    BMI 25.23 kg/m2       Physical Exam:  Dressing dry, AMERICA with serosanginous fluid, a few BS, tender    Intake and Output:  Current Shift:     Last three shifts:  05/27 1901 - 05/29 0700  In: 2142.5 [P.O.:300; I.V.:1842.5]  Out: 3225 [Urine:2600; Drains:75]    Labs: Results:       Chemistry Recent Labs      05/26/17   1600   GLU  104*   NA  139   K  3.8   CL  101   CO2  30   BUN  13   CREA  0.69   CA  9.7   AGAP  8   BUCR  19   AP  68   TP  8.3*   ALB  4.2   GLOB  4.1*   AGRAT  1.0      CBC w/Diff Recent Labs      05/26/17   1600   WBC  17.9*   RBC  4.15*   HGB  12.9   HCT  37.6   PLT  388   GRANS  80*   LYMPH  14*   EOS  1      Cardiac Enzymes No results for input(s): CPK, CKND1, ELIANA in the last 72 hours. No lab exists for component: CKRMB, TROIP   Coagulation No results for input(s): PTP, INR, APTT in the last 72 hours. No lab exists for component: INREXT    Lipid Panel No results found for: CHOL, CHOLPOCT, CHOLX, CHLST, CHOLV, [id number], HDL, LDL, NLDLCT, DLDL, LDLC, DLDLP, [id number], VLDLC, VLDL, TGL, TGLX, TRIGL, [id number], TRIGP, TGLPOCT, [id number], CHHD, CHHDX   BNP No results for input(s): BNPP in the last 72 hours.    Liver Enzymes Recent Labs      05/26/17   1600   TP  8.3*   ALB  4.2   AP  68   SGOT  26      Thyroid Studies No results found for: T4, T3U, TSH, TSHEXT         Medications Reviewed      Assessment/Plan     Active Problems:    Surgery follow-up (5/26/2017)        Cont NPO

## 2017-05-29 NOTE — ROUTINE PROCESS
Bedside and Verbal shift change report given to EKATERINA Grande (oncoming nurse) by Jesi Brown RN (offgoing nurse). Report included the following information SBAR, Kardex, Intake/Output, MAR and Recent Results.

## 2017-05-30 PROCEDURE — 65270000029 HC RM PRIVATE

## 2017-05-30 PROCEDURE — 74011000250 HC RX REV CODE- 250: Performed by: SURGERY

## 2017-05-30 PROCEDURE — C9113 INJ PANTOPRAZOLE SODIUM, VIA: HCPCS | Performed by: SURGERY

## 2017-05-30 PROCEDURE — 74011000258 HC RX REV CODE- 258: Performed by: SURGERY

## 2017-05-30 PROCEDURE — 74011250636 HC RX REV CODE- 250/636: Performed by: SURGERY

## 2017-05-30 RX ADMIN — HYDROMORPHONE HYDROCHLORIDE 2 MG: 1 INJECTION, SOLUTION INTRAMUSCULAR; INTRAVENOUS; SUBCUTANEOUS at 08:13

## 2017-05-30 RX ADMIN — HYDROMORPHONE HYDROCHLORIDE 2 MG: 1 INJECTION, SOLUTION INTRAMUSCULAR; INTRAVENOUS; SUBCUTANEOUS at 11:59

## 2017-05-30 RX ADMIN — SODIUM CHLORIDE 40 MG: 9 INJECTION INTRAMUSCULAR; INTRAVENOUS; SUBCUTANEOUS at 20:57

## 2017-05-30 RX ADMIN — HYDROMORPHONE HYDROCHLORIDE 2 MG: 1 INJECTION, SOLUTION INTRAMUSCULAR; INTRAVENOUS; SUBCUTANEOUS at 10:30

## 2017-05-30 RX ADMIN — CEFOXITIN SODIUM 1 G: 1 POWDER, FOR SOLUTION INTRAVENOUS at 11:59

## 2017-05-30 RX ADMIN — HYDROMORPHONE HYDROCHLORIDE 2 MG: 1 INJECTION, SOLUTION INTRAMUSCULAR; INTRAVENOUS; SUBCUTANEOUS at 23:47

## 2017-05-30 RX ADMIN — CEFOXITIN SODIUM 1 G: 1 POWDER, FOR SOLUTION INTRAVENOUS at 20:57

## 2017-05-30 RX ADMIN — Medication 10 ML: at 21:04

## 2017-05-30 RX ADMIN — HYDROMORPHONE HYDROCHLORIDE 2 MG: 1 INJECTION, SOLUTION INTRAMUSCULAR; INTRAVENOUS; SUBCUTANEOUS at 18:31

## 2017-05-30 RX ADMIN — HYDROMORPHONE HYDROCHLORIDE 2 MG: 1 INJECTION, SOLUTION INTRAMUSCULAR; INTRAVENOUS; SUBCUTANEOUS at 16:08

## 2017-05-30 RX ADMIN — CEFOXITIN SODIUM 1 G: 1 POWDER, FOR SOLUTION INTRAVENOUS at 03:58

## 2017-05-30 RX ADMIN — SODIUM CHLORIDE 40 MG: 9 INJECTION INTRAMUSCULAR; INTRAVENOUS; SUBCUTANEOUS at 08:21

## 2017-05-30 RX ADMIN — HYDROMORPHONE HYDROCHLORIDE 2 MG: 1 INJECTION, SOLUTION INTRAMUSCULAR; INTRAVENOUS; SUBCUTANEOUS at 14:02

## 2017-05-30 RX ADMIN — HYDROMORPHONE HYDROCHLORIDE 1 MG: 1 INJECTION, SOLUTION INTRAMUSCULAR; INTRAVENOUS; SUBCUTANEOUS at 20:57

## 2017-05-30 RX ADMIN — HYDROMORPHONE HYDROCHLORIDE 2 MG: 1 INJECTION, SOLUTION INTRAMUSCULAR; INTRAVENOUS; SUBCUTANEOUS at 03:58

## 2017-05-30 RX ADMIN — HYDROMORPHONE HYDROCHLORIDE 2 MG: 1 INJECTION, SOLUTION INTRAMUSCULAR; INTRAVENOUS; SUBCUTANEOUS at 06:16

## 2017-05-30 RX ADMIN — HYDROMORPHONE HYDROCHLORIDE 2 MG: 1 INJECTION, SOLUTION INTRAMUSCULAR; INTRAVENOUS; SUBCUTANEOUS at 01:49

## 2017-05-30 NOTE — ROUTINE PROCESS
19:45: Received bedside report from Judith Dye RN at change of shift. Pt received lying in bed and moaning softly. Family members in attendance. When asked how she was feeling Pt and family inquired as to the timing of her next PRN analgesic. Dilaudid 2 MG IV given at 2034.

## 2017-05-30 NOTE — PROGRESS NOTES
Progress Note    Patient: Bassam Higgins MRN: [id number]  CSN: [id number]    YOB: 1985  Age: 34 y.o. Sex: female    DOA: 5/26/2017 LOS:  LOS: 3 days                    Subjective:     C/o pain, no N/V, no flatus yet but feeling rumbling    Objective:      Visit Vitals    /72    Pulse 63    Temp 98 °F (36.7 °C)    Resp 16    Ht 5' 5\" (1.651 m)    Wt 71.4 kg (157 lb 8 oz)    SpO2 94%    BMI 26.21 kg/m2       Physical Exam:  AMERICA with serous fluid, abd soft, incisional tenderness    Intake and Output:  Current Shift:  05/30 0701 - 05/30 1900  In: 0   Out: 385 [Urine:375; Drains:10]  Last three shifts:  05/28 1901 - 05/30 0700  In: 2240 [P.O.:420; I.V.:1820]  Out: 1030 [Urine:1000; Drains:30]    Labs: Results:       Chemistry No results for input(s): GLU, NA, K, CL, CO2, BUN, CREA, CA, AGAP, BUCR, TBIL, GPT, AP, TP, ALB, GLOB, AGRAT in the last 72 hours. CBC w/Diff No results for input(s): WBC, RBC, HGB, HCT, PLT, GRANS, LYMPH, EOS, HGBEXT, HCTEXT, PLTEXT in the last 72 hours. Cardiac Enzymes No results for input(s): CPK, CKND1, ELIANA in the last 72 hours. No lab exists for component: CKRMB, TROIP   Coagulation No results for input(s): PTP, INR, APTT in the last 72 hours. No lab exists for component: INREXT    Lipid Panel No results found for: CHOL, CHOLPOCT, CHOLX, CHLST, CHOLV, [id number], HDL, LDL, NLDLCT, DLDL, LDLC, DLDLP, [id number], VLDLC, VLDL, TGL, TGLX, TRIGL, [id number], TRIGP, TGLPOCT, [id number], CHHD, CHHDX   BNP No results for input(s): BNPP in the last 72 hours. Liver Enzymes No results for input(s): TP, ALB, TBIL, AP, SGOT, GPT in the last 72 hours.     No lab exists for component: DBIL   Thyroid Studies No results found for: T4, T3U, TSH, TSHEXT         Medications Reviewed      Assessment/Plan     Active Problems:    Surgery follow-up (5/26/2017)        Decrease IVF, clear liquids, H pylori pending

## 2017-05-30 NOTE — ROUTINE PROCESS
Verbal and Written shift change report given to SAVANNAH Alicia RN (oncoming nurse) by Kimmie Tillman RN (offgoing nurse). Report included the following information SBAR, Kardex and MAR   .

## 2017-05-30 NOTE — ROUTINE PROCESS
Shift Summary: Pt requested and received PRN Dilaudid at punctual two (2) hour intervals. In between medication administrations she was noted to sleep soundly.

## 2017-05-30 NOTE — PROGRESS NOTES
Pt admitted for abd pain due to appendicitis. Pt is  and is independent. Pt has been ambulating independently in the halls with her  and he will assist upon discharge. No plan of care needs have been identified. CM available to assist as needed    Discharge Reassessment Plan:  Low Risk    RRAT Score:  1 - 12     Low Risk Care Transition Interventions:  1. Discharge transition plan:  Physician follow up  2. Involved patient/caregiver in assessment, planning, education and implement of intervention. 3. CM daily patient care huddles/interdisciplinary rounds were completed. 4. PCP/Specialist appointment within 5 days made prior to discharge. Date/Time  5. Facilitated transportation and logistics for follow-up appointments. 6. Handoff to [facility] Nurse Navigator or PCP practice. Care Management Interventions  Mode of Transport at Discharge:  Other (see comment) (Family/)  Transition of Care Consult (CM Consult): Discharge Planning  Health Maintenance Reviewed: Yes  Current Support Network: Lives with Spouse  Confirm Follow Up Transport: Family  Plan discussed with Pt/Family/Caregiver: Yes  Discharge Location  Discharge Placement: Home

## 2017-05-31 LAB
H PYLORI IGA SER-ACNC: <9 UNITS (ref 0–8.9)
H PYLORI IGG SER IA-ACNC: 5.3 U/ML (ref 0–0.8)

## 2017-05-31 PROCEDURE — 74011250637 HC RX REV CODE- 250/637: Performed by: SURGERY

## 2017-05-31 PROCEDURE — 65270000029 HC RM PRIVATE

## 2017-05-31 PROCEDURE — C9113 INJ PANTOPRAZOLE SODIUM, VIA: HCPCS | Performed by: SURGERY

## 2017-05-31 PROCEDURE — 74011000258 HC RX REV CODE- 258: Performed by: SURGERY

## 2017-05-31 PROCEDURE — 74011000250 HC RX REV CODE- 250: Performed by: SURGERY

## 2017-05-31 PROCEDURE — 74011250636 HC RX REV CODE- 250/636: Performed by: SURGERY

## 2017-05-31 PROCEDURE — 77010033678 HC OXYGEN DAILY

## 2017-05-31 RX ORDER — OXYCODONE AND ACETAMINOPHEN 5; 325 MG/1; MG/1
1 TABLET ORAL
Status: DISCONTINUED | OUTPATIENT
Start: 2017-05-31 | End: 2017-05-31

## 2017-05-31 RX ORDER — OXYCODONE AND ACETAMINOPHEN 5; 325 MG/1; MG/1
2 TABLET ORAL
Status: DISCONTINUED | OUTPATIENT
Start: 2017-05-31 | End: 2017-06-01 | Stop reason: HOSPADM

## 2017-05-31 RX ADMIN — SODIUM CHLORIDE 40 MG: 9 INJECTION INTRAMUSCULAR; INTRAVENOUS; SUBCUTANEOUS at 09:50

## 2017-05-31 RX ADMIN — HYDROMORPHONE HYDROCHLORIDE 1 MG: 1 INJECTION, SOLUTION INTRAMUSCULAR; INTRAVENOUS; SUBCUTANEOUS at 04:11

## 2017-05-31 RX ADMIN — DEXTROSE MONOHYDRATE, SODIUM CHLORIDE, AND POTASSIUM CHLORIDE 50 ML/HR: 50; 4.5; 1.49 INJECTION, SOLUTION INTRAVENOUS at 00:00

## 2017-05-31 RX ADMIN — HYDROMORPHONE HYDROCHLORIDE 2 MG: 1 INJECTION, SOLUTION INTRAMUSCULAR; INTRAVENOUS; SUBCUTANEOUS at 12:40

## 2017-05-31 RX ADMIN — HYDROMORPHONE HYDROCHLORIDE 2 MG: 1 INJECTION, SOLUTION INTRAMUSCULAR; INTRAVENOUS; SUBCUTANEOUS at 01:57

## 2017-05-31 RX ADMIN — HYDROMORPHONE HYDROCHLORIDE 1 MG: 1 INJECTION, SOLUTION INTRAMUSCULAR; INTRAVENOUS; SUBCUTANEOUS at 06:33

## 2017-05-31 RX ADMIN — OXYCODONE AND ACETAMINOPHEN 2 TABLET: 5; 325 TABLET ORAL at 21:40

## 2017-05-31 RX ADMIN — CEFOXITIN SODIUM 1 G: 1 POWDER, FOR SOLUTION INTRAVENOUS at 12:40

## 2017-05-31 RX ADMIN — OXYCODONE AND ACETAMINOPHEN 1 TABLET: 5; 325 TABLET ORAL at 14:42

## 2017-05-31 RX ADMIN — HYDROMORPHONE HYDROCHLORIDE 1 MG: 1 INJECTION, SOLUTION INTRAMUSCULAR; INTRAVENOUS; SUBCUTANEOUS at 09:50

## 2017-05-31 RX ADMIN — CEFOXITIN SODIUM 1 G: 1 POWDER, FOR SOLUTION INTRAVENOUS at 04:11

## 2017-05-31 RX ADMIN — OXYCODONE AND ACETAMINOPHEN 1 TABLET: 5; 325 TABLET ORAL at 18:36

## 2017-05-31 NOTE — PROGRESS NOTES
Bedside and Verbal shift change report given to JULIA Sands RN (oncoming nurse) by SAVANNAH Mederos RN (offgoing nurse). Report included the following information SBAR, Kardex, MAR and Recent Results.     Alarm parameters reviewed, on and audible Appropriate for patient clinical condition

## 2017-05-31 NOTE — PROGRESS NOTES
Progress Note    Patient: Priyank Tate MRN: [id number]  CSN: [id number]    YOB: 1985  Age: 34 y.o. Sex: female    DOA: 5/26/2017 LOS:  LOS: 4 days                    Subjective:     +flatus and BM, some cramping after eating, but no N/V    Objective:      Visit Vitals    /84 (BP 1 Location: Left arm, BP Patient Position: At rest)    Pulse 60    Temp 98.4 °F (36.9 °C)    Resp 16    Ht 5' 5\" (1.651 m)    Wt 71.4 kg (157 lb 8 oz)    SpO2 95%    BMI 26.21 kg/m2       Physical Exam:  abd flat with BS, dressing is dry    Intake and Output:  Current Shift:     Last three shifts:  05/29 1901 - 05/31 0700  In: 1500.8 [P.O.:640; I.V.:860.8]  Out: 1487 [Urine:1475; Drains:12]    Labs: Results:       Chemistry No results for input(s): GLU, NA, K, CL, CO2, BUN, CREA, CA, AGAP, BUCR, TBIL, GPT, AP, TP, ALB, GLOB, AGRAT in the last 72 hours. CBC w/Diff No results for input(s): WBC, RBC, HGB, HCT, PLT, GRANS, LYMPH, EOS, HGBEXT, HCTEXT, PLTEXT in the last 72 hours. Cardiac Enzymes No results for input(s): CPK, CKND1, ELIANA in the last 72 hours. No lab exists for component: CKRMB, TROIP   Coagulation No results for input(s): PTP, INR, APTT in the last 72 hours. No lab exists for component: INREXT    Lipid Panel No results found for: CHOL, CHOLPOCT, CHOLX, CHLST, CHOLV, [id number], HDL, LDL, NLDLCT, DLDL, LDLC, DLDLP, [id number], VLDLC, VLDL, TGL, TGLX, TRIGL, [id number], TRIGP, TGLPOCT, [id number], CHHD, CHHDX   BNP No results for input(s): BNPP in the last 72 hours. Liver Enzymes No results for input(s): TP, ALB, TBIL, AP, SGOT, GPT in the last 72 hours.     No lab exists for component: DBIL   Thyroid Studies No results found for: T4, T3U, TSH, TSHEXT         Medications Reviewed      Assessment/Plan     Active Problems:    Surgery follow-up (5/26/2017)        Increase diet, d/c IVF

## 2017-05-31 NOTE — PROGRESS NOTES
0800 Bedside report received from Katherin Van RN. Pt is in the restroom. No needs required at this time. 1200 Bedside report given to EKATERINA Patrick RN. Shift uneventful. No signs of distress.

## 2017-05-31 NOTE — PROGRESS NOTES
2055 - Pt resting in bed at this time, visitor at bedside. A&Ox4, RA. Denies chest pain, SOB. Mepilex dressing present to midline incision site CDI. Pain rated 6/10, pain medication administered per MAR. AMERICA drain to right side of abdomen present and draining. 24G to right arm patent. No other concerns voiced at this time. Bed in lowest position, call bell within reach. 2346 - Patient rated pain 7/10, pain medication administered per MAR. No other concerns voiced at this time. Call bell within reach, bed in lowest position. 7968 - Patient rated pain 6/10, pain medication administered per MAR. No other concerns voiced at this time. Call bell within reach, bed in lowest position. 7219 - Patient rated pain 5/10, pain medication administered per MAR. No other concerns voiced at this time. Call bell within reach, bed in lowest position. 8703 - Patient rated pain 7/10, pain medication administered per MAR. No other concerns voiced at this time. Call bell within reach, bed in lowest position.

## 2017-05-31 NOTE — ROUTINE PROCESS
Bedside and Verbal shift change report given to King Valente RN by Maldonado Luciano RN. Report included the following information SBAR, Kardex, OR Summary, Intake/Output and MAR.

## 2017-06-01 VITALS
TEMPERATURE: 98.1 F | DIASTOLIC BLOOD PRESSURE: 84 MMHG | BODY MASS INDEX: 25.53 KG/M2 | WEIGHT: 153.22 LBS | SYSTOLIC BLOOD PRESSURE: 137 MMHG | OXYGEN SATURATION: 95 % | RESPIRATION RATE: 18 BRPM | HEIGHT: 65 IN | HEART RATE: 68 BPM

## 2017-06-01 PROCEDURE — 74011250637 HC RX REV CODE- 250/637: Performed by: SURGERY

## 2017-06-01 RX ORDER — HYDROMORPHONE HYDROCHLORIDE 4 MG/1
4 TABLET ORAL
Qty: 30 TAB | Refills: 0 | Status: SHIPPED | OUTPATIENT
Start: 2017-06-01 | End: 2017-12-28

## 2017-06-01 RX ORDER — PANTOPRAZOLE SODIUM 20 MG/1
20 TABLET, DELAYED RELEASE ORAL 2 TIMES DAILY
Qty: 60 TAB | Refills: 0 | Status: SHIPPED | OUTPATIENT
Start: 2017-06-01 | End: 2017-12-28

## 2017-06-01 RX ADMIN — OXYCODONE AND ACETAMINOPHEN 2 TABLET: 5; 325 TABLET ORAL at 01:58

## 2017-06-01 RX ADMIN — OXYCODONE AND ACETAMINOPHEN 2 TABLET: 5; 325 TABLET ORAL at 10:42

## 2017-06-01 RX ADMIN — OXYCODONE AND ACETAMINOPHEN 2 TABLET: 5; 325 TABLET ORAL at 06:08

## 2017-06-01 NOTE — PROGRESS NOTES
Progress Note    Patient: Alysha yDe MRN: [id number]  CSN: [id number]    YOB: 1985  Age: 34 y.o. Sex: female    DOA: 5/26/2017 LOS:  LOS: 5 days                    Subjective:     crampy upper abd pain    Objective:      Visit Vitals    /72 (BP 1 Location: Left arm, BP Patient Position: At rest)    Pulse 65    Temp 98.8 °F (37.1 °C)    Resp 18    Ht 5' 5\" (1.651 m)    Wt 69.5 kg (153 lb 3.5 oz)    SpO2 93%    BMI 25.5 kg/m2       Physical Exam:  AMERICA with serosang, incision dry    Intake and Output:  Current Shift:     Last three shifts:  05/30 1901 - 06/01 0700  In: 1060.8 [P.O.:200; I.V.:860.8]  Out: 1102 [Urine:1100; Drains:2]    Labs: Results:       Chemistry No results for input(s): GLU, NA, K, CL, CO2, BUN, CREA, CA, AGAP, BUCR, TBIL, GPT, AP, TP, ALB, GLOB, AGRAT in the last 72 hours. CBC w/Diff No results for input(s): WBC, RBC, HGB, HCT, PLT, GRANS, LYMPH, EOS, HGBEXT, HCTEXT, PLTEXT in the last 72 hours. Cardiac Enzymes No results for input(s): CPK, CKND1, ELIANA in the last 72 hours. No lab exists for component: CKRMB, TROIP   Coagulation No results for input(s): PTP, INR, APTT in the last 72 hours. No lab exists for component: INREXT    Lipid Panel No results found for: CHOL, CHOLPOCT, CHOLX, CHLST, CHOLV, [id number], HDL, LDL, NLDLCT, DLDL, LDLC, DLDLP, [id number], VLDLC, VLDL, TGL, TGLX, TRIGL, [id number], TRIGP, TGLPOCT, [id number], CHHD, CHHDX   BNP No results for input(s): BNPP in the last 72 hours. Liver Enzymes No results for input(s): TP, ALB, TBIL, AP, SGOT, GPT in the last 72 hours.     No lab exists for component: DBIL   Thyroid Studies No results found for: T4, T3U, TSH, TSHEXT         Medications Reviewed      Assessment/Plan     Active Problems:    Surgery follow-up (5/26/2017)        Discharge home, rx for dilaudid and protonix, she will need abx for H pylori

## 2017-06-01 NOTE — PROGRESS NOTES
Shift summary: pt is up ad gilmar, voiding in bathroom and walked the hallway prior to and at end of shift. Percocet given for pain control. Refused IV start - MD aware as passed on in report . AMERICA drain to R side of abdomen without any output for shift. Mepilex to mid upper abdomen C/D/I. Active bowel sounds, tolerating po well, no nausea noted.  Shift uneventful

## 2017-06-01 NOTE — PROGRESS NOTES
Medication Reconciliation has been performed on this patient consisting of interview of the patient regarding their PTA Home Medication List, Allergies and PMH as well as obtaining outpatient pharmacy information in order to create a [address] EHR updated accordingly. Interviewed patient who was a good historian. Patient did not provide a written list of home medications. Patient's outpatient pharmacy is [facility]      Medication Reconciliation Interventions:   Wrong Medication Identified 0  Wrong/missing medication strength or dose identified 0  Wrong/missing Interval Identified 0  Wrong/missing Route Identified 0  Medication Duplication 0  Omissions 0  Commissions 0  Other Issue(s) Identified (Indicate): 0      Medication Compliance Issues and/or Medication Concerns: pt recieves Suboxone from [address]. Pt received 36 doses of Dilaudid and 6 doses of Percocet during her admission for post surgical pain. No outpatient pharmacy nor PCP information listed in [facility]. Spoke w/ Richelle Bella at [facility] to obtain PCP info : [facility] Leamon Journey at [facility] [phone], and suboxone prescribed by Dr. Patterson Friends [phone]. During interview of pt she admits she did not make Dr. Sonia Renee aware of hospitalization but gave me the OK to contact him on her behalf. Pt was unable to give name of her PCP but did say it was [facility]. in [place]. I had registration update PCP info in [facility]. Spoke w/ Olivia Palafox? At [address] at 1040 this am.  Explained that pt is being discharged this am with RX for Dilaudid. She will page Dr. Sonia Renee and have him call me. Spoke w/ pt RN Kym Roberts regarding situation, she states discharge RX's being filled at our [facility] as we speak. Spoke w/ Sorin Gross in [facility] who will recommend that pt get Narcan with her prescriptions.       Made second call to [facility] and left voicemail as still haven't heard from Dr. Jean-Paul Shaw. Dr. Javi Izquierdo is aware of outpatient suboxone and has indicated on A/V Discharge instructions for pt to stop Suboxone and start Diluadid and Protonix.     [facility] Pharmacist  [phone]

## 2017-06-01 NOTE — DISCHARGE INSTRUCTIONS
Patient armband removed and shredded    DISCHARGE SUMMARY from Nurse    The following personal items are in your possession at time of discharge:    Dental Appliances: None  Visual Aid: None     Home Medications: None  Jewelry: None  Clothing:  (with )  Other Valuables: None             PATIENT INSTRUCTIONS:    After general anesthesia or intravenous sedation, for 24 hours or while taking prescription Narcotics:  · Limit your activities  · Do not drive and operate hazardous machinery  · Do not make important personal or business decisions  · Do  not drink alcoholic beverages  · If you have not urinated within 8 hours after discharge, please contact your surgeon on call. Report the following to your surgeon:  · Excessive pain, swelling, redness or odor of or around the surgical area  · Temperature over 100.5  · Nausea and vomiting lasting longer than 4 hours or if unable to take medications  · Any signs of decreased circulation or nerve impairment to extremity: change in color, persistent  numbness, tingling, coldness or increase pain  · Any questions        What to do at Home:  Recommended activity: Activity as tolerated. If you experience any of the following symptoms abdominal pain,temperature greater than 100.5,drainage or foul odor from incision site,nausea and vomiting. *  Please give a list of your current medications to your Primary Care Provider. *  Please update this list whenever your medications are discontinued, doses are      changed, or new medications (including over-the-counter products) are added. *  Please carry medication information at all times in case of emergency situations. These are general instructions for a healthy lifestyle:    No smoking/ No tobacco products/ Avoid exposure to second hand smoke    Surgeon General's Warning:  Quitting smoking now greatly reduces serious risk to your health.     Obesity, smoking, and sedentary lifestyle greatly increases your risk for illness    A healthy diet, regular physical exercise & weight monitoring are important for maintaining a healthy lifestyle    You may be retaining fluid if you have a history of heart failure or if you experience any of the following symptoms:  Weight gain of 3 pounds or more overnight or 5 pounds in a week, increased swelling in our hands or feet or shortness of breath while lying flat in bed. Please call your doctor as soon as you notice any of these symptoms; do not wait until your next office visit. Recognize signs and symptoms of STROKE:    F-face looks uneven    A-arms unable to move or move unevenly    S-speech slurred or non-existent    T-time-call 911 as soon as signs and symptoms begin-DO NOT go       Back to bed or wait to see if you get better-TIME IS BRAIN. Warning Signs of HEART ATTACK     Call 911 if you have these symptoms:   Chest discomfort. Most heart attacks involve discomfort in the center of the chest that lasts more than a few minutes, or that goes away and comes back. It can feel like uncomfortable pressure, squeezing, fullness, or pain.  Discomfort in other areas of the upper body. Symptoms can include pain or discomfort in one or both arms, the back, neck, jaw, or stomach.  Shortness of breath with or without chest discomfort.  Other signs may include breaking out in a cold sweat, nausea, or lightheadedness. Don't wait more than five minutes to call 911 - MINUTES MATTER! Fast action can save your life. Calling 911 is almost always the fastest way to get lifesaving treatment. Emergency Medical Services staff can begin treatment when they arrive -- up to an hour sooner than if someone gets to the hospital by car. The discharge information has been reviewed with the patient. The patient verbalized understanding. Discharge medications reviewed with the patient and appropriate educational materials and side effects teaching were provided.

## 2017-06-01 NOTE — PROGRESS NOTES
7375  Received report. No c/o pain or discomfort. Midline abdominal dressing clean,dry and intact. AMERICA drain intact draining small amount of serosanguinous fluid. Call light within reach. 1110  No acute distress noted. Call light within reach. 1229   Discharge instructions and care notes reviewed with and given to patient. Patient able to retain information. AMERICA drain removed by Clover Gonzalez. [phone]   MD notified inference to suboxone and dialudid medication reconciliation. [facility] for patient to go home with prescription  dilaudid PO per Dr. Ino Mae. 1440  Patient discharged in stable condition. Transported to private vehicle by wheelchair.

## 2017-06-01 NOTE — PROGRESS NOTES
Spoke w/ Dr. Terrie Glez of [address] [place] [address] who is prescribing this patient's Suboxone. He has given verbal approval of pt receiving Dilaudid 4mg tabs #30 upon discharge from our facility and states he will follow up with the patient to transition back to Suboxone.     [facility] Pharmacist  [phone]

## 2017-06-01 NOTE — ROUTINE PROCESS
Bedside and Verbal shift change report given to Maryjane Cnote RN (oncoming nurse) by Kayy Wade RN   (offgoing nurse). Report included the following information SBAR, Kardex, Procedure Summary, Intake/Output, MAR and Recent Results.

## 2017-06-01 NOTE — ROUTINE PROCESS
Verbal shift change report given to EKATERINA Aponte RN (oncoming nurse) by Kurt Hess   (offgoing nurse). Report included the following information SBAR, Kardex and MAR.

## 2017-06-01 NOTE — PROGRESS NOTES
Shift summary:   Pt. Lost IV, requested to have medications switch to PO, does not want another IV if possible. Dr. Galilea Yang paged and updated on pt. Request. MD stated stop antibiotics, no need to replace IV. Order for 1 tab percocet q4 prn for pain management received. 1900 Pt. Stated her pain was not being managed well. MD paged and order modified to 2 tabs q4. Pt. Up as needed to the bathroom. Incision site and AMERICA drain intact. No complaints of nausea. Pain controlled well with revised order.      Patient Vitals for the past 12 hrs:   Temp Pulse Resp BP SpO2   05/31/17 2342 97.8 °F (36.6 °C) (!) 50 16 117/70 95 %   05/31/17 1954 98 °F (36.7 °C) (!) 51 16 127/76 93 %   05/31/17 1821 98 °F (36.7 °C) (!) 55 16 117/69 95 %

## 2017-07-10 NOTE — DISCHARGE SUMMARY
[facility] [address] SUMMARY    Name:  Mahi Jones  MR#:  [id number]  :  1985  Account #:  [de-identified]  Date of Adm:  2017  Date of Discharge:  2017      FINAL DIAGNOSES  1. Perforated duodenal ulcer. 2. Helicobacter pylori positivity. 3. History of opioid abuse. PROCEDURE: Laparoscope and open repair of perforated duodenal  ulcer. HISTORY OF PRESENT ILLNESS: A 27-year-old female who came  into the emergency room with right lower quadrant abdominal pain. A  CT scan revealed a questionably enlarged appendix and few flecks of  free air in the upper abdomen. She was admitted to the hospital for  laparoscopy. HOSPITAL COURSE: The patient was started on IV fluids and IV  antibiotics. At laparoscopy she was found to have exudate in the upper  abdomen. The appendix looked normal. She, therefore, underwent  exploratory laparotomy, and she was found to have a very small  perforated duodenal ulcer. This was oversewn and then a Elvera Minors  patch was placed. Her postoperative course was uneventful. She had  an NG tube placed at the time of surgery, and this was left for several  days. After bowel function returned, she was started on liquids and  then advanced to solids. She was maintained on IV antibiotics. She was  transitioned from IV narcotic to oral narcotic. Blood work did reveal H.  Pylori positivity. The plan was to start her antibiotic regimen as an outpatient. She was  started on high-dose IV proton pump inhibitors. She was discharged on  proton pump inhibitors, and she is to follow up in the office in a week. She was given a prescription for Percocet, and then once this  prescription was finished she will transition back to Suboxone which is  a pre-hospital medication.         MD WALE Muir / LENORA  D:  2017   09:23  T:  07/10/2017   09:40  Job #:  [id number]

## 2017-12-28 ENCOUNTER — APPOINTMENT (OUTPATIENT)
Dept: GENERAL RADIOLOGY | Age: 32
End: 2017-12-28
Attending: EMERGENCY MEDICINE
Payer: COMMERCIAL

## 2017-12-28 ENCOUNTER — HOSPITAL ENCOUNTER (EMERGENCY)
Age: 32
Discharge: HOME | End: 2017-12-28
Attending: EMERGENCY MEDICINE
Payer: COMMERCIAL

## 2017-12-28 ENCOUNTER — APPOINTMENT (OUTPATIENT)
Dept: CT IMAGING | Age: 32
End: 2017-12-28
Attending: EMERGENCY MEDICINE
Payer: COMMERCIAL

## 2017-12-28 VITALS
TEMPERATURE: 97.9 F | BODY MASS INDEX: 24.99 KG/M2 | RESPIRATION RATE: 18 BRPM | HEART RATE: 79 BPM | WEIGHT: 150 LBS | HEIGHT: 65 IN | OXYGEN SATURATION: 100 % | DIASTOLIC BLOOD PRESSURE: 91 MMHG | SYSTOLIC BLOOD PRESSURE: 138 MMHG

## 2017-12-28 DIAGNOSIS — K52.9 ENTERITIS: ICD-10-CM

## 2017-12-28 DIAGNOSIS — K56.7 ILEUS (HCC): Primary | ICD-10-CM

## 2017-12-28 DIAGNOSIS — Z79.891 LONG TERM CURRENT USE OF OPIATE ANALGESIC: ICD-10-CM

## 2017-12-28 LAB
ALBUMIN SERPL-MCNC: 4.4 G/DL (ref 3.4–5)
ALBUMIN/GLOB SERPL: 1 {RATIO} (ref 0.8–1.7)
ALP SERPL-CCNC: 92 U/L (ref 45–117)
ALT SERPL-CCNC: 19 U/L (ref 13–56)
AMPHET UR QL SCN: NEGATIVE
ANION GAP SERPL CALC-SCNC: 11 MMOL/L (ref 3–18)
APPEARANCE UR: CLEAR
AST SERPL-CCNC: 21 U/L (ref 15–37)
BARBITURATES UR QL SCN: NEGATIVE
BASOPHILS # BLD: 0 K/UL (ref 0–0.06)
BASOPHILS NFR BLD: 0 % (ref 0–2)
BENZODIAZ UR QL: NEGATIVE
BILIRUB SERPL-MCNC: 0.3 MG/DL (ref 0.2–1)
BILIRUB UR QL: NEGATIVE
BUN SERPL-MCNC: 12 MG/DL (ref 7–18)
BUN/CREAT SERPL: 14 (ref 12–20)
CALCIUM SERPL-MCNC: 10.4 MG/DL (ref 8.5–10.1)
CANNABINOIDS UR QL SCN: NEGATIVE
CHLORIDE SERPL-SCNC: 100 MMOL/L (ref 100–108)
CK MB CFR SERPL CALC: 1.5 % (ref 0–4)
CK MB SERPL-MCNC: 2.1 NG/ML (ref 5–25)
CK SERPL-CCNC: 142 U/L (ref 26–192)
CO2 SERPL-SCNC: 28 MMOL/L (ref 21–32)
COCAINE UR QL SCN: NEGATIVE
COLOR UR: YELLOW
CREAT SERPL-MCNC: 0.84 MG/DL (ref 0.6–1.3)
DIFFERENTIAL METHOD BLD: ABNORMAL
EOSINOPHIL # BLD: 0.1 K/UL (ref 0–0.4)
EOSINOPHIL NFR BLD: 1 % (ref 0–5)
ERYTHROCYTE [DISTWIDTH] IN BLOOD BY AUTOMATED COUNT: 14.5 % (ref 11.6–14.5)
GLOBULIN SER CALC-MCNC: 4.2 G/DL (ref 2–4)
GLUCOSE SERPL-MCNC: 113 MG/DL (ref 74–99)
GLUCOSE UR STRIP.AUTO-MCNC: NEGATIVE MG/DL
HCG SERPL QL: NEGATIVE
HCT VFR BLD AUTO: 43 % (ref 35–45)
HDSCOM,HDSCOM: NORMAL
HGB BLD-MCNC: 14.8 G/DL (ref 12–16)
HGB UR QL STRIP: NEGATIVE
KETONES UR QL STRIP.AUTO: ABNORMAL MG/DL
LEUKOCYTE ESTERASE UR QL STRIP.AUTO: NEGATIVE
LIPASE SERPL-CCNC: 98 U/L (ref 73–393)
LYMPHOCYTES # BLD: 2.7 K/UL (ref 0.9–3.6)
LYMPHOCYTES NFR BLD: 15 % (ref 21–52)
MCH RBC QN AUTO: 31.8 PG (ref 24–34)
MCHC RBC AUTO-ENTMCNC: 34.4 G/DL (ref 31–37)
MCV RBC AUTO: 92.3 FL (ref 74–97)
METHADONE UR QL: NEGATIVE
MONOCYTES # BLD: 0.9 K/UL (ref 0.05–1.2)
MONOCYTES NFR BLD: 5 % (ref 3–10)
NEUTS SEG # BLD: 14.4 K/UL (ref 1.8–8)
NEUTS SEG NFR BLD: 79 % (ref 40–73)
NITRITE UR QL STRIP.AUTO: NEGATIVE
OPIATES UR QL: NEGATIVE
PCP UR QL: NEGATIVE
PH UR STRIP: 8 [PH] (ref 5–8)
PLATELET # BLD AUTO: 458 K/UL (ref 135–420)
PMV BLD AUTO: 10.6 FL (ref 9.2–11.8)
POTASSIUM SERPL-SCNC: 3.5 MMOL/L (ref 3.5–5.5)
PROT SERPL-MCNC: 8.6 G/DL (ref 6.4–8.2)
PROT UR STRIP-MCNC: NEGATIVE MG/DL
RBC # BLD AUTO: 4.66 M/UL (ref 4.2–5.3)
SODIUM SERPL-SCNC: 139 MMOL/L (ref 136–145)
SP GR UR REFRACTOMETRY: >1.03 (ref 1–1.03)
TROPONIN I SERPL-MCNC: <0.02 NG/ML (ref 0–0.06)
UROBILINOGEN UR QL STRIP.AUTO: 1 EU/DL (ref 0.2–1)
WBC # BLD AUTO: 18.1 K/UL (ref 4.6–13.2)

## 2017-12-28 PROCEDURE — 74177 CT ABD & PELVIS W/CONTRAST: CPT

## 2017-12-28 PROCEDURE — 81003 URINALYSIS AUTO W/O SCOPE: CPT | Performed by: EMERGENCY MEDICINE

## 2017-12-28 PROCEDURE — 82550 ASSAY OF CK (CPK): CPT | Performed by: EMERGENCY MEDICINE

## 2017-12-28 PROCEDURE — 74011000250 HC RX REV CODE- 250: Performed by: EMERGENCY MEDICINE

## 2017-12-28 PROCEDURE — 80307 DRUG TEST PRSMV CHEM ANLYZR: CPT | Performed by: EMERGENCY MEDICINE

## 2017-12-28 PROCEDURE — 74011636320 HC RX REV CODE- 636/320: Performed by: EMERGENCY MEDICINE

## 2017-12-28 PROCEDURE — 96374 THER/PROPH/DIAG INJ IV PUSH: CPT

## 2017-12-28 PROCEDURE — 83690 ASSAY OF LIPASE: CPT | Performed by: EMERGENCY MEDICINE

## 2017-12-28 PROCEDURE — 99284 EMERGENCY DEPT VISIT MOD MDM: CPT

## 2017-12-28 PROCEDURE — 74011250637 HC RX REV CODE- 250/637: Performed by: EMERGENCY MEDICINE

## 2017-12-28 PROCEDURE — 96375 TX/PRO/DX INJ NEW DRUG ADDON: CPT

## 2017-12-28 PROCEDURE — 74011250636 HC RX REV CODE- 250/636: Performed by: EMERGENCY MEDICINE

## 2017-12-28 PROCEDURE — 84703 CHORIONIC GONADOTROPIN ASSAY: CPT | Performed by: EMERGENCY MEDICINE

## 2017-12-28 PROCEDURE — 80053 COMPREHEN METABOLIC PANEL: CPT | Performed by: EMERGENCY MEDICINE

## 2017-12-28 PROCEDURE — 85025 COMPLETE CBC W/AUTO DIFF WBC: CPT | Performed by: EMERGENCY MEDICINE

## 2017-12-28 PROCEDURE — 71010 XR CHEST PORT: CPT

## 2017-12-28 RX ORDER — DICYCLOMINE HYDROCHLORIDE 10 MG/1
20 CAPSULE ORAL
Status: COMPLETED | OUTPATIENT
Start: 2017-12-28 | End: 2017-12-28

## 2017-12-28 RX ORDER — KETOROLAC TROMETHAMINE 30 MG/ML
30 INJECTION, SOLUTION INTRAMUSCULAR; INTRAVENOUS
Status: COMPLETED | OUTPATIENT
Start: 2017-12-28 | End: 2017-12-28

## 2017-12-28 RX ORDER — ONDANSETRON 4 MG/1
TABLET, ORALLY DISINTEGRATING ORAL
Qty: 10 TAB | Refills: 0 | Status: SHIPPED | OUTPATIENT
Start: 2017-12-28

## 2017-12-28 RX ORDER — FAMOTIDINE 10 MG/ML
20 INJECTION INTRAVENOUS
Status: COMPLETED | OUTPATIENT
Start: 2017-12-28 | End: 2017-12-28

## 2017-12-28 RX ORDER — BUPRENORPHINE AND NALOXONE 8; 2 MG/1; MG/1
FILM, SOLUBLE BUCCAL; SUBLINGUAL DAILY
COMMUNITY

## 2017-12-28 RX ORDER — DICYCLOMINE HYDROCHLORIDE 20 MG/1
20 TABLET ORAL EVERY 6 HOURS
Qty: 20 TAB | Refills: 0 | Status: SHIPPED | OUTPATIENT
Start: 2017-12-28 | End: 2018-01-02

## 2017-12-28 RX ORDER — ONDANSETRON 2 MG/ML
4 INJECTION INTRAMUSCULAR; INTRAVENOUS
Status: COMPLETED | OUTPATIENT
Start: 2017-12-28 | End: 2017-12-28

## 2017-12-28 RX ADMIN — ONDANSETRON HYDROCHLORIDE 4 MG: 2 INJECTION INTRAMUSCULAR; INTRAVENOUS at 04:56

## 2017-12-28 RX ADMIN — IOPAMIDOL 100 ML: 612 INJECTION, SOLUTION INTRAVENOUS at 06:30

## 2017-12-28 RX ADMIN — DICYCLOMINE HYDROCHLORIDE 20 MG: 10 CAPSULE ORAL at 06:49

## 2017-12-28 RX ADMIN — KETOROLAC TROMETHAMINE 30 MG: 30 INJECTION, SOLUTION INTRAMUSCULAR at 06:49

## 2017-12-28 RX ADMIN — FAMOTIDINE 20 MG: 10 INJECTION, SOLUTION INTRAVENOUS at 06:49

## 2017-12-28 NOTE — ED NOTES
Patient given & reviewed discharge instructions. Leonel Johnson MD answered questions and concerns prior to discharge. Adrianne discarded & shredded appropriately.

## 2017-12-28 NOTE — ED PROVIDER NOTES
EMERGENCY DEPARTMENT HISTORY AND PHYSICAL EXAM    Date: 12/28/2017  Patient Name: Gilda Woods    History of Presenting Illness     Chief Complaint   Patient presents with    Abdominal Pain    Vomiting         History Provided By: Patient    Chief Complaint: Diffuse abdominal pain  Duration: 1 day  Timing:  Worsening  Location: Diffuse abdomen  Severity: 8 out of 10  Modifying Factors: Worsened when standing up and slightly improved with vomiting. Associated Symptoms: N/V, abdominal distention, subjective fever, chills, body aches, and HA    Additional History (Context):   5:11 AM  Gilda Woods is a 34 y.o. female with PMHX opoid abuse and peptic ulcer who presents ambulatory to the emergency department C/O constant abdominal pain (rated 8/10), onset 4 hours ago. Worsened when standing up and slightly improved with vomiting. Associated sxs include N/V, abdominal distention, subjective fever, chills, body aches, and HA. Pt states that pain is different from peptic ulcer pain. Note she does not have abdominal pain frequently. Pt reports she has abnormal menstrual cycles. Endorses tobacco use. Note hx for perforated ulcer. Pt denies urinary sxs, ear pain, or any other sxs or complaints. PCP: CARLOS Grace    Current Outpatient Prescriptions   Medication Sig Dispense Refill    buprenorphine-naloxone (SUBOXONE) 8-2 mg film sublingaul film by SubLINGual route daily.  dicyclomine (BENTYL) 20 mg tablet Take 1 Tab by mouth every six (6) hours for 20 doses. 20 Tab 0    ondansetron (ZOFRAN ODT) 4 mg disintegrating tablet Take 1-2 tablets every 6-8 hours as needed for nausea and vomiting. 10 Tab 0       Past History     Past Medical History:  Past Medical History:   Diagnosis Date    Opioid abuse     Ulcer ([facility] [address] 2017       Past Surgical History:  Past Surgical History:   Procedure Laterality Date    HX TUBAL LIGATION         Family History:  History reviewed.  No pertinent family history. Social History:  Social History   Substance Use Topics    Smoking status: Current Every Day Smoker     Packs/day: 1.00    Smokeless tobacco: Never Used    Alcohol use No       Allergies: Allergies   Allergen Reactions    Pcn [Penicillins] Hives    Sulfa (Sulfonamide Antibiotics) Hives         Review of Systems   Review of Systems   Constitutional: Positive for chills and fever (subjective). Negative for diaphoresis and unexpected weight change. HENT: Negative for congestion, drooling, ear pain, rhinorrhea, sore throat, tinnitus and trouble swallowing. Eyes: Negative for photophobia, pain, redness and visual disturbance. Respiratory: Negative for cough, choking, chest tightness, shortness of breath, wheezing and stridor. Cardiovascular: Negative for chest pain, palpitations and leg swelling. Gastrointestinal: Positive for abdominal distention, abdominal pain (diffuse), nausea and vomiting. Negative for anal bleeding, blood in stool, constipation and diarrhea. Genitourinary: Negative for difficulty urinating, dysuria, flank pain, frequency, hematuria and urgency. Musculoskeletal: Positive for myalgias (diffuse). Negative for arthralgias, back pain and neck pain. Skin: Negative for color change, rash and wound. Neurological: Positive for headaches. Negative for dizziness, seizures, syncope, speech difficulty and light-headedness. Hematological: Does not bruise/bleed easily. Psychiatric/Behavioral: Negative for agitation, behavioral problems, hallucinations, self-injury and suicidal ideas. The patient is not hyperactive. Physical Exam     Vitals:    12/28/17 0354   BP: (!) 138/91   Pulse: 79   Resp: 18   Temp: 97.9 °F (36.6 °C)   SpO2: 100%   Weight: 68 kg (150 lb)   Height: 5' 5\" (1.651 m)     Physical Exam   Constitutional: She is oriented to person, place, and time. She appears well-developed and well-nourished. She appears distressed.    Uncomfortable appearing   HENT: Head: Normocephalic and atraumatic. Right Ear: External ear normal.   Left Ear: External ear normal.   Mouth/Throat: Oropharynx is clear and moist. No oropharyngeal exudate. Eyes: Conjunctivae and EOM are normal. Pupils are equal, round, and reactive to light. No scleral icterus. No pallor   Neck: Normal range of motion. Neck supple. No JVD present. No tracheal deviation present. No thyromegaly present. Cardiovascular: Normal rate, regular rhythm and normal heart sounds. Pulmonary/Chest: Effort normal and breath sounds normal. No stridor. No respiratory distress. Abdominal: Soft. Bowel sounds are normal. She exhibits no distension. There is no tenderness. There is no rebound and no guarding. Musculoskeletal: Normal range of motion. She exhibits no edema or tenderness. No soft tissue injuries   Lymphadenopathy:     She has no cervical adenopathy. Neurological: She is alert and oriented to person, place, and time. She has normal reflexes. No cranial nerve deficit. Coordination normal.   Skin: Skin is warm and dry. No rash noted. She is not diaphoretic. No erythema. Psychiatric: She has a normal mood and affect. Her behavior is normal. Judgment and thought content normal.   Nursing note and vitals reviewed. Diagnostic Study Results     Labs -     Recent Results (from the past 12 hour(s))   CBC WITH AUTOMATED DIFF    Collection Time: 12/28/17  4:01 AM   Result Value Ref Range    WBC 18.1 (H) 4.6 - 13.2 K/uL    RBC 4.66 4.20 - 5.30 M/uL    HGB 14.8 12.0 - 16.0 g/dL    HCT 43.0 35.0 - 45.0 %    MCV 92.3 74.0 - 97.0 FL    MCH 31.8 24.0 - 34.0 PG    MCHC 34.4 31.0 - 37.0 g/dL    RDW 14.5 11.6 - 14.5 %    PLATELET 508 (H) 330 - 420 K/uL    MPV 10.6 9.2 - 11.8 FL    NEUTROPHILS 79 (H) 40 - 73 %    LYMPHOCYTES 15 (L) 21 - 52 %    MONOCYTES 5 3 - 10 %    EOSINOPHILS 1 0 - 5 %    BASOPHILS 0 0 - 2 %    ABS. NEUTROPHILS 14.4 (H) 1.8 - 8.0 K/UL    ABS. LYMPHOCYTES 2.7 0.9 - 3.6 K/UL    ABS.  MONOCYTES 0.9 0.05 - 1.2 K/UL    ABS. EOSINOPHILS 0.1 0.0 - 0.4 K/UL    ABS. BASOPHILS 0.0 0.0 - 0.06 K/UL    DF AUTOMATED     METABOLIC PANEL, COMPREHENSIVE    Collection Time: 12/28/17  4:01 AM   Result Value Ref Range    Sodium 139 136 - 145 mmol/L    Potassium 3.5 3.5 - 5.5 mmol/L    Chloride 100 100 - 108 mmol/L    CO2 28 21 - 32 mmol/L    Anion gap 11 3.0 - 18 mmol/L    Glucose 113 (H) 74 - 99 mg/dL    BUN 12 7.0 - 18 MG/DL    Creatinine 0.84 0.6 - 1.3 MG/DL    BUN/Creatinine ratio 14 12 - 20      GFR est AA >60 >60 ml/min/1.73m2    GFR est non-AA >60 >60 ml/min/1.73m2    Calcium 10.4 (H) 8.5 - 10.1 MG/DL    Bilirubin, total 0.3 0.2 - 1.0 MG/DL    ALT (SGPT) 19 13 - 56 U/L    AST (SGOT) 21 15 - 37 U/L    Alk.  phosphatase 92 45 - 117 U/L    Protein, total 8.6 (H) 6.4 - 8.2 g/dL    Albumin 4.4 3.4 - 5.0 g/dL    Globulin 4.2 (H) 2.0 - 4.0 g/dL    A-G Ratio 1.0 0.8 - 1.7     LIPASE    Collection Time: 12/28/17  4:01 AM   Result Value Ref Range    Lipase 98 73 - 393 U/L   CARDIAC PANEL,(CK, CKMB & TROPONIN)    Collection Time: 12/28/17  4:01 AM   Result Value Ref Range     26 - 192 U/L    CK - MB 2.1 <3.6 ng/ml    CK-MB Index 1.5 0.0 - 4.0 %    Troponin-I, Qt. <0.02 0.00 - 0.06 NG/ML   HCG QL SERUM    Collection Time: 12/28/17  4:01 AM   Result Value Ref Range    HCG, Ql. NEGATIVE  NEG     URINALYSIS W/ RFLX MICROSCOPIC    Collection Time: 12/28/17  6:45 AM   Result Value Ref Range    Color YELLOW      Appearance CLEAR      Specific gravity >1.030 (H) 1.005 - 1.030    pH (UA) 8.0 5.0 - 8.0      Protein NEGATIVE  NEG mg/dL    Glucose NEGATIVE  NEG mg/dL    Ketone TRACE (A) NEG mg/dL    Bilirubin NEGATIVE  NEG      Blood NEGATIVE  NEG      Urobilinogen 1.0 0.2 - 1.0 EU/dL    Nitrites NEGATIVE  NEG      Leukocyte Esterase NEGATIVE  NEG     DRUG SCREEN, URINE    Collection Time: 12/28/17  6:45 AM   Result Value Ref Range    BENZODIAZEPINES NEGATIVE  NEG      BARBITURATES NEGATIVE  NEG      THC (TH-CANNABINOL) NEGATIVE  NEG OPIATES NEGATIVE  NEG      PCP(PHENCYCLIDINE) NEGATIVE  NEG      COCAINE NEGATIVE  NEG      AMPHETAMINES NEGATIVE  NEG      METHADONE NEGATIVE  NEG      HDSCOM (NOTE)        Radiologic Studies -    CT ABD PELV W CONT   Final Result      XR CHEST PORT    (Results Pending)     CT Results  (Last 48 hours)               12/28/17 0637  CT ABD PELV W CONT Final result    Impression:  IMPRESSION:           1. Mildly prominent appendix without change since May 2017 along with interval   developing lower mesentery fat stranding and moderate fluid-filled distention of   mid to distal small bowel extending to the terminal ileum. Differential   considerations include chronic appendicitis with small bowel ileus, inflammatory   or infectious enteritis with or without chronic appendicitis with possible   localized stricture at the ileocecal valve. 2. Interval developing low volume ascites. No focal fluid collections. Narrative:  EXAM: CT of the abdomen and pelvis       INDICATION: Abdominal pain       COMPARISON: 5/26/2017       TECHNIQUE: Helical volumetric scanning through the abdomen and pelvis was   performed without oral, with nonionic intravenous contrast. Axial, sagittal and   coronal reconstructions were generated. One or more dose reduction techniques   were used on this CT: automated exposure control, adjustment of the mAs and/or   kVp according to patient's size, and iterative reconstruction techniques. The   specific techniques utilized on this CT exam have been documented in the   patient's electronic medical record.       _______________       FINDINGS:       LOWER CHEST: Within normal range. LIVER, BILIARY: Liver is normal.  No biliary dilation. Bladder is normal.   Previous gas in the michelle hepatis, possibly in the common duct is no longer   seen. PANCREAS: Normal.       SPLEEN: Normal.       ADRENALS: Normal.       KIDNEYS: Normal.       LYMPH NODES: No enlarged lymph nodes. GASTROINTESTINAL TRACT: Small hiatal hernia is redemonstrated. Several loops of   mid to distal small bowel are moderately distended and predominantly   fluid-filled extending to the terminal ileum. Small bowel wall is not   significantly thickened, however the mucosa is perhaps slightly hyperenhancing. The colon is decompressed. The appendix is slightly prominent measuring 8-9 mm   at its mid to distal aspect without change. Interval developing low volume   ascites adjacent to the inferior liver, minimally in the right paracolic gutter   and in the right pelvis. No organized fluid collections. Minimal fat stranding   is present in the lower mesentery. PELVIC ORGANS: Within normal range. VASCULATURE: Within normal range. BONES: No acute or aggressive osseous abnormalities identified. OTHER: None.       _______________               CXR Results  (Last 48 hours)    None            Medical Decision Making   I am the first provider for this patient. I reviewed the vital signs, available nursing notes, past medical history, past surgical history, family history and social history. Vital Signs-Reviewed the patient's vital signs. Pulse Oximetry Analysis - 100% on RA     Cardiac Monitor:  Rate: 69 bpm  Rhythm: NSR    Records Reviewed: Nursing Notes    Provider Notes (Medical Decision Making):   MDM: Gastritis, pancreatitis, PUD, SBO, LBO, ileus, bladder infection or stone, kidney infection or stone, cardiopulmonary origins are unlikely, ovarian torsion, cyst or PID also possible. Procedures:  Procedures    ED Course:   5:11 PM   Initial assessment performed. The patients presenting problems have been discussed, and they are in agreement with the care plan formulated and outlined with them. I have encouraged them to ask questions as they arise throughout their visit. 7:57 AM  CT results discussed at length causes of ileus or multiple, infectious as well as long term meds.   We discussed admuissionm but she desires to go home. Risks and benefits discussed at length and we highly encouragedher to return for any recurrence or worsening of symptoms. Diagnosis and Disposition       DISCHARGE NOTE:  7:59 AM  Estela Mtz's  results have been reviewed with her. She has been counseled regarding her diagnosis, treatment, and plan. She verbally conveys understanding and agreement of the signs, symptoms, diagnosis, treatment and prognosis and additionally agrees to follow up as discussed. She also agrees with the care-plan and conveys that all of her questions have been answered. I have also provided discharge instructions for her that include: educational information regarding their diagnosis and treatment, and list of reasons why they would want to return to the ED prior to their follow-up appointment, should her condition change. She has been provided with education for proper emergency department utilization. CLINICAL IMPRESSION:    1. Ileus ([address]    2. Enteritis    3. Long term current use of opiate analgesic        PLAN:  1. D/C Home  2. Current Discharge Medication List      START taking these medications    Details   dicyclomine (BENTYL) 20 mg tablet Take 1 Tab by mouth every six (6) hours for 20 doses. Qty: 20 Tab, Refills: 0      ondansetron (ZOFRAN ODT) 4 mg disintegrating tablet Take 1-2 tablets every 6-8 hours as needed for nausea and vomiting. Qty: 10 Tab, Refills: 0           3. Follow-up Information     None        _______________________________    Attestations: This note is prepared by Luis Armando Vasquez, acting as Scribe for Madeline. Bharath Garcia MD.    Madeline. Bharath Garcia MD:  The scribe's documentation has been prepared under my direction and personally reviewed by me in its entirety.   I confirm that the note above accurately reflects all work, treatment, procedures, and medical decision making performed by me.  _______________________________

## 2017-12-28 NOTE — ED NOTES
Bedside shift change report given to SHARYN RN (oncoming nurse) by Chuy Og RN (offgoing nurse). Report included the following information SBAR, Kardex, ED Summary and MAR.

## 2017-12-28 NOTE — DISCHARGE INSTRUCTIONS
`  Learning About Ileus  What is ileus? Ileus (say \"ILL-ee-us\") is a blockage of the bowel (intestines) that happens when the intestines don't work as they should. Normally, muscles in the intestines squeeze to push food and waste along. When this process stops, the intestines stop digesting food and moving waste out of the body. This may also be called paralytic ileus. Ileus is not the same as a mechanical bowel obstruction. In a mechanical bowel obstruction, something is actually blocking the intestine, like scar tissue or a tumor. That is not true in ileus. Ileus sometimes happens after surgery to the belly. But it can also be caused by other things, such as some medicines, certain diseases or infections, and nerve problems. The doctor may do a number of tests. These tests may include X-rays, blood tests, and a CT scan. Testing can help the doctor be sure that nothing is blocking the intestines. Most people who have ileus need to be treated in the hospital.  What are the symptoms? Symptoms may include:  · Cramping belly pain. · Bloating. · Nausea or vomiting. · Not passing stool or gas. How is ileus treated? You will need to avoid eating solid food until you are better. Instead, you will get fluids and nutrition through a vein (IV). This helps prevent dehydration. It also lets your bowel rest.  You may have a tube that goes through your nose and into your stomach. This can help ease pain and bloating. You may get other treatments, depending on what caused ileus. For example, a medicine might be stopped if it is affecting your bowel. The intestines will often start working again in a few days. Signs of this include being able to pass gas or have a bowel movement. As your intestines start working, you will switch slowly from a liquid diet back to solid foods. Follow-up care is a key part of your treatment and safety.  Be sure to make and go to all appointments, and call your doctor if you are having problems. It's also a good idea to know your test results and keep a list of the medicines you take. Where can you learn more? Go to Toopher.be  Enter M933 in the search box to learn more about \"Learning About Ileus. \"   © 9668-3225 Healthwise, [employer]. Care instructions adapted under license by [employer] (which disclaims liability or warranty for this information). This care instruction is for use with your licensed healthcare professional. If you have questions about a medical condition or this instruction, always ask your healthcare professional. [address] any warranty or liability for your use of this information. Content Version: 91.9.449205; Current as of: November 20, 2015             Gastroenteritis: Care Instructions  Your Care Instructions    Gastroenteritis is an illness that may cause nausea, vomiting, and diarrhea. It is sometimes called \"stomach flu. \" It can be caused by bacteria or a virus. You will probably begin to feel better in 1 to 2 days. In the meantime, get plenty of rest and make sure you do not become dehydrated. Dehydration occurs when your body loses too much fluid. Follow-up care is a key part of your treatment and safety. Be sure to make and go to all appointments, and call your doctor if you are having problems. It's also a good idea to know your test results and keep a list of the medicines you take. How can you care for yourself at home? · If your doctor prescribed antibiotics, take them as directed. Do not stop taking them just because you feel better. You need to take the full course of antibiotics. · Drink plenty of fluids to prevent dehydration, enough so that your urine is light yellow or clear like water. Choose water and other caffeine-free clear liquids until you feel better.  If you have kidney, heart, or liver disease and have to limit fluids, talk with your doctor before you increase your fluid intake. · Drink fluids slowly, in frequent, small amounts, because drinking too much too fast can cause vomiting. · Begin eating mild foods, such as dry toast, yogurt, applesauce, bananas, and rice. Avoid spicy, hot, or high-fat foods, and do not drink alcohol or caffeine for a day or two. Do not drink milk or eat ice cream until you are feeling better. How to prevent gastroenteritis  · Keep hot foods hot and cold foods cold. · Do not eat meats, dressings, salads, or other foods that have been kept at room temperature for more than 2 hours. · Use a thermometer to check your refrigerator. It should be between 34°F and 40°F.  · Defrost meats in the refrigerator or microwave, not on the kitchen counter. · Keep your hands and your kitchen clean. Wash your hands, cutting boards, and countertops with hot soapy water frequently. · Cook meat until it is well done. · Do not eat raw eggs or uncooked sauces made with raw eggs. · Do not take chances. If food looks or tastes spoiled, throw it out. When should you call for help? Call 911 anytime you think you may need emergency care. For example, call if:  ? · You vomit blood or what looks like coffee grounds. ? · You passed out (lost consciousness). ? · You pass maroon or very bloody stools. ?Call your doctor now or seek immediate medical care if:  ? · You have severe belly pain. ? · You have signs of needing more fluids. You have sunken eyes, a dry mouth, and pass only a little dark urine. ? · You feel like you are going to faint. ? · You have increased belly pain that does not go away in 1 to 2 days. ? · You have new or increased nausea, or you are vomiting. ? · You have a new or higher fever. ? · Your stools are black and tarlike or have streaks of blood. ? Watch closely for changes in your health, and be sure to contact your doctor if:  ? · You are dizzy or lightheaded. ? · You urinate less than usual, or your urine is dark yellow or brown. ? · You do not feel better with each day that goes by. Where can you learn more? Go to http://stephanie-mike.info/. Enter N142 in the search box to learn more about \"Gastroenteritis: Care Instructions. \"  Current as of: March 3, 2017  Content Version: 11.4  © 7302-3133 [employer]. Care instructions adapted under license by [employer] (which disclaims liability or warranty for this information). If you have questions about a medical condition or this instruction, always ask your healthcare professional. [place] [address] any warranty or liability for your use of this information. Gastroenteritis: Care Instructions  Your Care Instructions    Gastroenteritis is an illness that may cause nausea, vomiting, and diarrhea. It is sometimes called \"stomach flu. \" It can be caused by bacteria or a virus. You will probably begin to feel better in 1 to 2 days. In the meantime, get plenty of rest and make sure you do not become dehydrated. Dehydration occurs when your body loses too much fluid. Follow-up care is a key part of your treatment and safety. Be sure to make and go to all appointments, and call your doctor if you are having problems. It's also a good idea to know your test results and keep a list of the medicines you take. How can you care for yourself at home? · If your doctor prescribed antibiotics, take them as directed. Do not stop taking them just because you feel better. You need to take the full course of antibiotics. · Drink plenty of fluids to prevent dehydration, enough so that your urine is light yellow or clear like water. Choose water and other caffeine-free clear liquids until you feel better. If you have kidney, heart, or liver disease and have to limit fluids, talk with your doctor before you increase your fluid intake.   · Drink fluids slowly, in frequent, small amounts, because drinking too much too fast can cause vomiting. · Begin eating mild foods, such as dry toast, yogurt, applesauce, bananas, and rice. Avoid spicy, hot, or high-fat foods, and do not drink alcohol or caffeine for a day or two. Do not drink milk or eat ice cream until you are feeling better. How to prevent gastroenteritis  · Keep hot foods hot and cold foods cold. · Do not eat meats, dressings, salads, or other foods that have been kept at room temperature for more than 2 hours. · Use a thermometer to check your refrigerator. It should be between 34°F and 40°F.  · Defrost meats in the refrigerator or microwave, not on the kitchen counter. · Keep your hands and your kitchen clean. Wash your hands, cutting boards, and countertops with hot soapy water frequently. · Cook meat until it is well done. · Do not eat raw eggs or uncooked sauces made with raw eggs. · Do not take chances. If food looks or tastes spoiled, throw it out. When should you call for help? Call 911 anytime you think you may need emergency care. For example, call if:  ? · You vomit blood or what looks like coffee grounds. ? · You passed out (lost consciousness). ? · You pass maroon or very bloody stools. ?Call your doctor now or seek immediate medical care if:  ? · You have severe belly pain. ? · You have signs of needing more fluids. You have sunken eyes, a dry mouth, and pass only a little dark urine. ? · You feel like you are going to faint. ? · You have increased belly pain that does not go away in 1 to 2 days. ? · You have new or increased nausea, or you are vomiting. ? · You have a new or higher fever. ? · Your stools are black and tarlike or have streaks of blood. ? Watch closely for changes in your health, and be sure to contact your doctor if:  ? · You are dizzy or lightheaded. ? · You urinate less than usual, or your urine is dark yellow or brown. ? · You do not feel better with each day that goes by. Where can you learn more?   Go to http://stephanie-mike.info/. Enter N142 in the search box to learn more about \"Gastroenteritis: Care Instructions. \"  Current as of: March 3, 2017  Content Version: 11.4  © 3948-2287 Healthwise, [employer]. Care instructions adapted under license by [employer] (which disclaims liability or warranty for this information). If you have questions about a medical condition or this instruction, always ask your healthcare professional. [place] [address] any warranty or liability for your use of this information.

## 2018-11-09 ENCOUNTER — HOSPITAL ENCOUNTER (OUTPATIENT)
Dept: MRI IMAGING | Age: 33
Discharge: HOME | End: 2018-11-09
Attending: OBSTETRICS & GYNECOLOGY
Payer: COMMERCIAL

## 2018-11-09 ENCOUNTER — APPOINTMENT (OUTPATIENT)
Dept: MRI IMAGING | Age: 33
End: 2018-11-09
Attending: OBSTETRICS & GYNECOLOGY
Payer: COMMERCIAL

## 2018-11-09 DIAGNOSIS — R97.1 ELEVATED CANCER ANTIGEN 125 (CA 125): ICD-10-CM

## 2018-11-09 DIAGNOSIS — R14.0 ABDOMINAL DISTENTION: ICD-10-CM

## 2018-11-09 DIAGNOSIS — R10.2 PELVIC AND PERINEAL PAIN: ICD-10-CM

## 2018-11-09 PROCEDURE — 74011636320 HC RX REV CODE- 636/320: Performed by: OBSTETRICS & GYNECOLOGY

## 2018-11-09 PROCEDURE — 72197 MRI PELVIS W/O & W/DYE: CPT

## 2018-11-09 PROCEDURE — A9575 INJ GADOTERATE MEGLUMI 0.1ML: HCPCS | Performed by: OBSTETRICS & GYNECOLOGY

## 2018-11-09 RX ADMIN — GADOTERATE MEGLUMINE 15 ML: 376.9 INJECTION INTRAVENOUS at 13:59

## (undated) DEVICE — REM POLYHESIVE ADULT PATIENT RETURN ELECTRODE: Brand: VALLEYLAB

## (undated) DEVICE — GOWN,SIRUS,NONRNF,SETINSLV,XL,20/CS: Brand: MEDLINE

## (undated) DEVICE — Device

## (undated) DEVICE — SUTURE PDS II SZ 1 L36IN ABSRB VLT L48MM CTX 1/2 CIR Z371T

## (undated) DEVICE — MEDI-VAC SUCTION HANDLE REGULAR CAPACITY: Brand: CARDINAL HEALTH

## (undated) DEVICE — NEEDLE HYPO 25GA L1.5IN BVL ORIENTED ECLIPSE

## (undated) DEVICE — SLEEVE TRCR L100MM DIA5MM UNIV STBL FOR BLDELSS DIL TIP

## (undated) DEVICE — NEEDLE INSUF L120MM DIA2MM DISP FOR PNEUMOPERI ENDOPATH

## (undated) DEVICE — AMD ANTIMICROBIAL DRAIN SPONGES, 6 PLY, 0.2% POLYHEXAMETHYLENE BIGUANIDE HCI (PHMB): Brand: EXCILON

## (undated) DEVICE — STERILE POLYISOPRENE POWDER-FREE SURGICAL GLOVES WITH EMOLLIENT COATING: Brand: PROTEXIS

## (undated) DEVICE — (D)SYR 10ML 1/5ML GRAD NSAF -- PKGING CHANGE USE ITEM 338027

## (undated) DEVICE — SUT VCRL + 0 36IN UR6 VIO --

## (undated) DEVICE — SUTURE PERMAHAND SZ 3-0 L18IN NONABSORBABLE BLK L26MM SH C013D

## (undated) DEVICE — (D)PREP SKN CHLRAPRP APPL 26ML -- CONVERT TO ITEM 371833

## (undated) DEVICE — SPONGE LAP 18X18IN STRL -- 5/PK

## (undated) DEVICE — (D)STRIP SKN CLSR 0.5X4IN WHT --

## (undated) DEVICE — 3-0 COATED VICRYL PLUS UNDYED 1X27" SH --

## (undated) DEVICE — TROCAR ENDOSCP SHFT L100MM DIA5MM DIL TIP ENDOPATH XCEL

## (undated) DEVICE — SOL IRRIGATION INJ NACL 0.9% 500ML BTL

## (undated) DEVICE — MEDI-VAC NON-CONDUCTIVE SUCTION TUBING: Brand: CARDINAL HEALTH

## (undated) DEVICE — ENDOCUT SCISSOR TIP, DISPOSABLE: Brand: RENEW

## (undated) DEVICE — DISPOSABLE SUCTION/IRRIGATOR TUBE SET WITH TIP: Brand: AHTO

## (undated) DEVICE — SUT MONOCRYL PLUS UD 4-0 --

## (undated) DEVICE — DEVON™ KNEE AND BODY STRAP 60" X 3" (1.5 M X 7.6 CM): Brand: DEVON

## (undated) DEVICE — 3L THIN WALL CAN: Brand: CRD

## (undated) DEVICE — SYRINGE MED 20ML STD CLR PLAS LUERLOCK TIP N CTRL DISP

## (undated) DEVICE — SHEARS ENDOSCP L36CM DIA5MM ULTRASONIC CRV TIP W/ ADV

## (undated) DEVICE — SUT MCRYL + 3-0 27IN PS1 UD --

## (undated) DEVICE — TROCAR ENDOSCP L100MM DIA12MM DIL TIP STBL SL ENDOPATH XCEL

## (undated) DEVICE — INTENDED FOR TISSUE SEPARATION, AND OTHER PROCEDURES THAT REQUIRE A SHARP SURGICAL BLADE TO PUNCTURE OR CUT.: Brand: BARD-PARKER ® CARBON RIB-BACK BLADES

## (undated) DEVICE — DRESSING FOAM SELF ADH 20X10 CM ABSORBENT MEPILEX BORDER

## (undated) DEVICE — MASTISOL ADHESIVE LIQ 2/3ML

## (undated) DEVICE — DRAIN SURG 15FR L3/16IN SIL RND 3/4 FLUT 3/16IN TRCR

## (undated) DEVICE — KENDALL SCD EXPRESS SLEEVES, KNEE LENGTH, MEDIUM: Brand: KENDALL SCD

## (undated) DEVICE — SUT ETHLN 3-0 18IN PS2 BLK --